# Patient Record
Sex: FEMALE | Race: WHITE | NOT HISPANIC OR LATINO | Employment: OTHER | URBAN - METROPOLITAN AREA
[De-identification: names, ages, dates, MRNs, and addresses within clinical notes are randomized per-mention and may not be internally consistent; named-entity substitution may affect disease eponyms.]

---

## 2017-01-31 DIAGNOSIS — B37.2 CANDIDIASIS OF SKIN: Primary | ICD-10-CM

## 2017-01-31 RX ORDER — CICLOPIROX OLAMINE 7.7 MG/G
CREAM TOPICAL 2 TIMES DAILY
Qty: 30 G | Refills: 2 | Status: SHIPPED | OUTPATIENT
Start: 2017-01-31 | End: 2017-08-11

## 2017-04-03 DIAGNOSIS — Z76.0 MEDICATION REFILL: ICD-10-CM

## 2017-04-03 DIAGNOSIS — K30 ACID INDIGESTION: Primary | ICD-10-CM

## 2017-04-03 RX ORDER — CIMETIDINE 400 MG
TABLET ORAL
Qty: 90 TABLET | Refills: 0 | Status: SHIPPED | OUTPATIENT
Start: 2017-04-03 | End: 2017-07-12

## 2017-05-19 ENCOUNTER — TRANSFERRED RECORDS (OUTPATIENT)
Dept: FAMILY MEDICINE | Facility: CLINIC | Age: 82
End: 2017-05-19

## 2017-06-08 DIAGNOSIS — Z76.0 ENCOUNTER FOR MEDICATION REFILL: ICD-10-CM

## 2017-06-08 RX ORDER — LISINOPRIL 2.5 MG/1
TABLET ORAL
Qty: 90 TABLET | Refills: 0 | Status: SHIPPED | OUTPATIENT
Start: 2017-06-08 | End: 2017-09-13

## 2017-06-27 ENCOUNTER — MEDICAL CORRESPONDENCE (OUTPATIENT)
Dept: FAMILY MEDICINE | Facility: CLINIC | Age: 82
End: 2017-06-27

## 2017-07-12 DIAGNOSIS — K30 ACID INDIGESTION: ICD-10-CM

## 2017-07-12 DIAGNOSIS — Z76.0 MEDICATION REFILL: ICD-10-CM

## 2017-07-13 RX ORDER — CIMETIDINE 400 MG
TABLET ORAL
Qty: 90 TABLET | Refills: 0 | Status: SHIPPED | OUTPATIENT
Start: 2017-07-13 | End: 2017-10-11

## 2017-07-28 DIAGNOSIS — B37.2 YEAST DERMATITIS: ICD-10-CM

## 2017-07-28 RX ORDER — NYSTATIN 100000 [USP'U]/G
POWDER TOPICAL
Qty: 60 G | Refills: 0 | Status: SHIPPED | OUTPATIENT
Start: 2017-07-28 | End: 2017-10-04

## 2017-08-11 DIAGNOSIS — B37.2 CANDIDIASIS OF SKIN: ICD-10-CM

## 2017-08-14 RX ORDER — CICLOPIROX OLAMINE 7.7 MG/G
CREAM TOPICAL
Qty: 30 G | Refills: 0 | Status: SHIPPED | OUTPATIENT
Start: 2017-08-14 | End: 2017-09-15

## 2017-08-18 ENCOUNTER — OFFICE VISIT (OUTPATIENT)
Dept: FAMILY MEDICINE | Facility: CLINIC | Age: 82
End: 2017-08-18

## 2017-08-18 VITALS
RESPIRATION RATE: 20 BRPM | SYSTOLIC BLOOD PRESSURE: 116 MMHG | HEART RATE: 96 BPM | DIASTOLIC BLOOD PRESSURE: 58 MMHG | TEMPERATURE: 99 F | OXYGEN SATURATION: 91 %

## 2017-08-18 DIAGNOSIS — J20.9 ACUTE BRONCHITIS, UNSPECIFIED ORGANISM: Primary | ICD-10-CM

## 2017-08-18 DIAGNOSIS — R53.81 PHYSICAL DECONDITIONING: ICD-10-CM

## 2017-08-18 DIAGNOSIS — G72.41 INCLUSION BODY MYOSITIS (H): ICD-10-CM

## 2017-08-18 PROCEDURE — 99214 OFFICE O/P EST MOD 30 MIN: CPT | Performed by: FAMILY MEDICINE

## 2017-08-18 RX ORDER — AZITHROMYCIN 250 MG/1
TABLET, FILM COATED ORAL
Qty: 6 TABLET | Refills: 0 | Status: SHIPPED | OUTPATIENT
Start: 2017-08-18 | End: 2018-07-18

## 2017-08-18 NOTE — PROGRESS NOTES
SUBJECTIVE:   Nelia Solano  is a 89 year old  year old female presenting with a complaint of  Cough.   The  cough productive of green/yellow sputum, with shortness of breath, waxing and waning over time for 3 days.   Quality: copious  Severity: moderate  Associated symptoms: myalgias and malaise.  Current Outpatient Prescriptions   Medication Sig Dispense Refill     ciclopirox (LOPROX) 0.77 % cream APPLY EXTERNALLY TO THE AFFECTED AREA TWICE DAILY 30 g 0     NYSTOP 536907 UNIT/GM POWD powder APPLY TOPICALLY TWICE DAILY AS NEEDED. 60 g 0     cimetidine (TAGAMET) 400 MG tablet TAKE 1 TABLET BY MOUTH AT BEDTIME 90 tablet 0     lisinopril (PRINIVIL/ZESTRIL) 2.5 MG tablet TAKE 1 TABLET BY MOUTH DAILY 90 tablet 0     PRIMIDONE PO Take 50 mg by mouth See Admin Instructions 50mg in am, 50 mg  at noon , 25 mg dinner time       Methotrexate Sodium (METHOTREXATE PO) Take 2.5 mg by mouth once a week 5 tabs = 12 mg wed or thur       VITAMIN D, CHOLECALCIFEROL, PO Take 2,000 Units by mouth daily       calcium carb 1250 mg, 500 mg [place],/vitamin D 200 units (OSCAL WITH D) 500-200 MG-UNIT per tablet Take 1 tablet by mouth 2 times daily (with meals)       ALPRAZolam (XANAX) 0.25 MG tablet Take 1 tablet (0.25 mg) by mouth 3 times daily 30 tablet 0     folic acid (FOLVITE) 1 MG tablet Take 1 mg by mouth 3 times daily.       Social History   Substance Use Topics     Smoking status: Never Smoker     Smokeless tobacco: Never Used     Alcohol use 3.0 oz/week     6 drink(s) per week       ROS:  CONSTITUTIONAL:no fever, no chills or sweats, no excessive fatigue, no significant change in weight  ENT: no ear pain, no nose or sinus problems, no mouth or throat problems, no dysphagia, no hoarseness  ROS otherwise negative    OBJECTIVE  :/58  Pulse 96  Temp 99  F (37.2  C)  Resp 20  SpO2 91%   APPEARANCE: = mild distress, fatigued and in a wheelchair  Conj/Eyelids = Nrl  PERRLA/Irises = Nrl  Ears/Nose = Nrl  Ear canals and TM's =  Nrl  Lips/Teeth/Gums = Nrl  Oropharynx = Nrl  Neck = Nrl  Resp effort = Nrl  Breath Sounds =  bilateral rhonchi  Recent/Remote Memory = Nrl  NEURO: mentation intact, speech normal, tremor resting and weakness of legs and arms  Mood/Affect = Nrl    Assessment/Plan:  Nelia was seen today for cough and fever.    Diagnoses and all orders for this visit:    Acute bronchitis, unspecified organism  -     azithromycin (ZITHROMAX) 250 MG tablet; Two tablets first day, then one tablet daily for four days.    Physical deconditioning    Inclusion body myositis    >25 min spent with patient, greater than 50% spent on discussion/education/planning, etc. About The primary encounter diagnosis was Acute bronchitis, unspecified organism. Diagnoses of Physical deconditioning and Inclusion body myositis were also pertinent to this visit.  Long discussion about living situation. In the end they feel that they are ok, they basically live on one floor    Pavan Kern MD  [facility]  [phone]

## 2017-08-18 NOTE — MR AVS SNAPSHOT
"              After Visit Summary   2017    Nelia Solano    MRN: [id number]           Patient Information     Date Of Birth          1928        Visit Information        Provider Department      2017 10:45 AM Pavan Kern MD [facility]        Today's Diagnoses     Acute bronchitis, unspecified organism    -  1    Physical deconditioning        Inclusion body myositis           Follow-ups after your visit        Who to contact     If you have questions or need follow up information about today's clinic visit or your schedule please contact [facility] directly at [phone].  Normal or non-critical lab and imaging results will be communicated to you by [email or web address], letter or phone within 4 business days after the clinic has received the results. If you do not hear from us within 7 days, please contact the clinic through [email or web address] or phone. If you have a critical or abnormal lab result, we will notify you by phone as soon as possible.  Submit refill requests through [employer] or call your pharmacy and they will forward the refill request to us. Please allow 3 business days for your refill to be completed.          Additional Information About Your Visit        MyChart Information     Hy[employer]os lets you send messages to your doctor, view your test results, renew your prescriptions, schedule appointments and more. To sign up, go to [email or web address] . Click on \"Log in\" on the left side of the screen, which will take you to the Welcome page. Then click on \"Sign up Now\" on the right side of the page.     You will be asked to enter the access code listed below, as well as some personal information. Please follow the directions to create your username and password.     Your access code is: [email or web address]  Expires: 2017 11:27 AM     Your access code will  in 90 days. If you need help or a new code, please call your [facility] clinic or [phone].        Care " EveryWhere ID     This is your Care EveryWhere ID. This could be used by other organizations to access your [place] medical records  [id number]        Your Vitals Were     Pulse Temperature Respirations Pulse Oximetry          96 99  F (37.2  C) 20 91%         Blood Pressure from Last 3 Encounters:   08/18/17 116/58   11/07/16 122/80   02/11/16 104/62    Weight from Last 3 Encounters:   01/26/15 62 kg (136 lb 11 oz)   12/24/13 56.7 kg (125 lb)   09/13/13 59 kg (130 lb)              Today, you had the following     No orders found for display         Today's Medication Changes          These changes are accurate as of: 8/18/17 11:27 AM.  If you have any questions, ask your nurse or doctor.               Start taking these medicines.        Dose/Directions    azithromycin 250 MG tablet   Commonly known as:  ZITHROMAX   Used for:  Acute bronchitis, unspecified organism   Started by:  Pavan Kern MD        Two tablets first day, then one tablet daily for four days.   Quantity:  6 tablet   Refills:  0            Where to get your medicines      These medications were sent to [facility] [id number] [place] [address] [address] AT [facility] [address]  [address], [facility] [address]     Phone:  [phone]     azithromycin 250 MG tablet                Primary Care Provider Office Phone # Fax #    Pavan Kern [phone] [address]  [facility] [address]        Equal Access to Services     PATRICIA CHAND AH: Hadii aad ku hadasho Soclaraali, waaxda luqadaha, qaybta kaalmada adeegyada, waxuma que butterfield. So [facility] [phone].    ATENCIÓN: Si habla español, tiene a landis disposición servicios gratuitos de asistencia lingüística. Llame al [phone].    We comply with applicable federal civil rights laws and [place] laws. We do not discriminate on the basis of race, color, national origin, age, disability sex, sexual orientation or  gender identity.            Thank you!     Thank you for choosing [facility]  for your care. Our goal is always to provide you with excellent care. Hearing back from our patients is one way we can continue to improve our services. Please take a few minutes to complete the written survey that you may receive in the mail after your visit with us. Thank you!             Your Updated Medication List - Protect others around you: Learn how to safely use, store and throw away your medicines at www.disposemymeds.org.          This list is accurate as of: 8/18/17 11:27 AM.  Always use your most recent med list.                   Brand Name Dispense Instructions for use Diagnosis    ALPRAZolam 0.25 MG tablet    XANAX    30 tablet    Take 1 tablet (0.25 mg) by mouth 3 times daily    Anxiety       azithromycin 250 MG tablet    ZITHROMAX    6 tablet    Two tablets first day, then one tablet daily for four days.    Acute bronchitis, unspecified organism       calcium carb 1250 mg (500 mg [place])/vitamin D 200 units 500-200 MG-UNIT per tablet    OSCAL with D     Take 1 tablet by mouth 2 times daily (with meals)        ciclopirox 0.77 % cream    LOPROX    30 g    APPLY EXTERNALLY TO THE AFFECTED AREA TWICE DAILY    Candidiasis of skin       cimetidine 400 MG tablet    TAGAMET    90 tablet    TAKE 1 TABLET BY MOUTH AT BEDTIME    Medication refill, Acid indigestion       folic acid 1 MG tablet    FOLVITE     Take 1 mg by mouth 3 times daily.        lisinopril 2.5 MG tablet    PRINIVIL/Zestril    90 tablet    TAKE 1 TABLET BY MOUTH DAILY    Encounter for medication refill       METHOTREXATE PO      Take 2.5 mg by mouth once a week 5 tabs = 12 mg wed or thur        NYSTOP 739471 UNIT/GM Powd   Generic drug:  nystatin     60 g    APPLY TOPICALLY TWICE DAILY AS NEEDED.    Yeast dermatitis       PRIMIDONE PO      Take 50 mg by mouth See Admin Instructions 50mg in am, 50 mg  at noon , 25 mg dinner time        VITAMIN D  (CHOLECALCIFEROL) PO      Take 2,000 Units by mouth daily

## 2017-09-13 DIAGNOSIS — Z76.0 ENCOUNTER FOR MEDICATION REFILL: ICD-10-CM

## 2017-09-13 NOTE — TELEPHONE ENCOUNTER
Pending Prescriptions:                       Disp   Refills    lisinopril (PRINIVIL/ZESTRIL) 2.5 MG table*90 tab*0        Sig: TAKE 1 TABLET BY MOUTH DAILY    Last OV 8/18/17  Patient needs CPX    Lab Results   Component Value Date    WBC 6.5 02/11/2016    WBC 10.9 01/28/2015     Lab Results   Component Value Date    RBC 4.03 02/11/2016    RBC 3.56 01/28/2015     Lab Results   Component Value Date    HGB 12.2 02/11/2016     Lab Results   Component Value Date    HCT 35.8 02/11/2016     No components found for: MCT  Lab Results   Component Value Date    MCV 88.7 02/11/2016     Lab Results   Component Value Date    MCH 30.3 02/11/2016     Lab Results   Component Value Date    MCHC 34.1 02/11/2016     Lab Results   Component Value Date    RDW 16.1 01/28/2015     Lab Results   Component Value Date     02/11/2016     TSH   Date Value Ref Range Status   10/10/2011 1.73 0.4 - 5.0 mU/L Final       Last Basic Metabolic Panel:  Lab Results   Component Value Date     02/11/2016      Lab Results   Component Value Date    POTASSIUM 4.2 02/11/2016     Lab Results   Component Value Date    CHLORIDE 99 02/11/2016     Lab Results   Component Value Date    BRANDON 9.6 02/11/2016     Lab Results   Component Value Date    CO2 27 01/27/2015     Lab Results   Component Value Date    BUN 13 02/11/2016    BUN 34 02/11/2016     Lab Results   Component Value Date    CR 0.38 02/11/2016     Lab Results   Component Value Date    GLC 79 02/11/2016

## 2017-09-15 DIAGNOSIS — B37.2 CANDIDIASIS OF SKIN: ICD-10-CM

## 2017-09-15 DIAGNOSIS — Z76.0 ENCOUNTER FOR MEDICATION REFILL: Primary | ICD-10-CM

## 2017-09-15 RX ORDER — CICLOPIROX OLAMINE 7.7 MG/G
CREAM TOPICAL
Qty: 30 G | Refills: 3 | Status: SHIPPED | OUTPATIENT
Start: 2017-09-15 | End: 2018-05-12

## 2017-09-15 RX ORDER — LISINOPRIL 2.5 MG/1
TABLET ORAL
Qty: 90 TABLET | Refills: 3 | Status: SHIPPED | OUTPATIENT
Start: 2017-09-15 | End: 2018-09-15

## 2017-09-15 NOTE — TELEPHONE ENCOUNTER
Pending Prescriptions:                       Disp   Refills    ciclopirox (LOPROX) 0.77 % cream [Pharmacy*30 g   0        Sig: APPLY EXTERNALLY TO THE AFFECTED AREA TWICE DAILY    Last OV 8/18/17    Lab Results   Component Value Date    WBC 6.5 02/11/2016    WBC 10.9 01/28/2015     Lab Results   Component Value Date    RBC 4.03 02/11/2016    RBC 3.56 01/28/2015     Lab Results   Component Value Date    HGB 12.2 02/11/2016     Lab Results   Component Value Date    HCT 35.8 02/11/2016     No components found for: MCT  Lab Results   Component Value Date    MCV 88.7 02/11/2016     Lab Results   Component Value Date    MCH 30.3 02/11/2016     Lab Results   Component Value Date    MCHC 34.1 02/11/2016     Lab Results   Component Value Date    RDW 16.1 01/28/2015     Lab Results   Component Value Date     02/11/2016     TSH   Date Value Ref Range Status   10/10/2011 1.73 0.4 - 5.0 mU/L Final     Last Basic Metabolic Panel:  Lab Results   Component Value Date     02/11/2016      Lab Results   Component Value Date    POTASSIUM 4.2 02/11/2016     Lab Results   Component Value Date    CHLORIDE 99 02/11/2016     Lab Results   Component Value Date    BRANDON 9.6 02/11/2016     Lab Results   Component Value Date    CO2 27 01/27/2015     Lab Results   Component Value Date    BUN 13 02/11/2016    BUN 34 02/11/2016     Lab Results   Component Value Date    CR 0.38 02/11/2016     Lab Results   Component Value Date    GLC 79 02/11/2016     No results found for: CHOL  No results found for: HDL  No results found for: LDL  No results found for: TRIG  No results found for: CHOLHDLRATIO

## 2017-10-04 DIAGNOSIS — B37.2 YEAST DERMATITIS: ICD-10-CM

## 2017-10-05 RX ORDER — NYSTATIN 100000 [USP'U]/G
POWDER TOPICAL
Qty: 60 G | Refills: 0 | Status: SHIPPED | OUTPATIENT
Start: 2017-10-05 | End: 2017-11-11

## 2017-10-09 DIAGNOSIS — Z23 NEED FOR PROPHYLACTIC VACCINATION AND INOCULATION AGAINST INFLUENZA: Primary | ICD-10-CM

## 2017-10-09 PROCEDURE — 90662 IIV NO PRSV INCREASED AG IM: CPT | Performed by: FAMILY MEDICINE

## 2017-10-09 PROCEDURE — G0008 ADMIN INFLUENZA VIRUS VAC: HCPCS | Performed by: FAMILY MEDICINE

## 2017-10-09 NOTE — PROGRESS NOTES
Injectable Influenza Immunization Documentation    1.  Is the person to be vaccinated sick today?   No    2. Does the person to be vaccinated have an allergy to a component   of the vaccine?   No    3. Has the person to be vaccinated ever had a serious reaction   to influenza vaccine in the past?   No    4. Has the person to be vaccinated ever had Guillain-Barré syndrome?   No    Form completed by leonardo sunshine

## 2017-10-11 DIAGNOSIS — K30 ACID INDIGESTION: ICD-10-CM

## 2017-10-11 DIAGNOSIS — Z76.0 MEDICATION REFILL: ICD-10-CM

## 2017-10-11 NOTE — TELEPHONE ENCOUNTER
tagamet last OV - 8/8/18 last labs 12/8/16 @ [facility]  Gilda Monsivais MA October 11, 2017 4:54 PM

## 2017-10-12 RX ORDER — CIMETIDINE 400 MG
TABLET ORAL
Qty: 90 TABLET | Refills: 3 | Status: SHIPPED | OUTPATIENT
Start: 2017-10-12 | End: 2022-01-01

## 2017-11-11 DIAGNOSIS — B37.2 YEAST DERMATITIS: ICD-10-CM

## 2017-11-13 RX ORDER — NYSTATIN 100000 [USP'U]/G
POWDER TOPICAL
Qty: 60 G | Refills: 0 | Status: SHIPPED | OUTPATIENT
Start: 2017-11-13 | End: 2017-12-08

## 2017-11-20 ENCOUNTER — TRANSFERRED RECORDS (OUTPATIENT)
Dept: FAMILY MEDICINE | Facility: CLINIC | Age: 82
End: 2017-11-20

## 2017-11-20 ENCOUNTER — HOSPITAL ENCOUNTER (OUTPATIENT)
Dept: LAB | Facility: CLINIC | Age: 82
Discharge: HOME | End: 2017-11-20
Attending: PSYCHIATRY & NEUROLOGY | Admitting: PSYCHIATRY & NEUROLOGY
Payer: MEDICARE

## 2017-11-20 ENCOUNTER — HOSPITAL ENCOUNTER (OUTPATIENT)
Dept: GENERAL RADIOLOGY | Facility: CLINIC | Age: 82
End: 2017-11-20
Attending: PSYCHIATRY & NEUROLOGY
Payer: MEDICARE

## 2017-11-20 DIAGNOSIS — M62.81 GENERALIZED MUSCLE WEAKNESS: ICD-10-CM

## 2017-11-20 DIAGNOSIS — G72.41 IBM (INCLUSION BODY MYOSITIS) (H): Primary | ICD-10-CM

## 2017-11-20 DIAGNOSIS — G72.41 INCLUSION BODY MYOSITIS (H): ICD-10-CM

## 2017-11-20 DIAGNOSIS — M60.9 MYOSITIS: ICD-10-CM

## 2017-11-20 LAB
ALT SERPL W P-5'-P-CCNC: 23 U/L (ref 0–50)
AST SERPL W P-5'-P-CCNC: 25 U/L (ref 0–45)
BILIRUB DIRECT SERPL-MCNC: <0.1 MG/DL (ref 0–0.2)
BILIRUB SERPL-MCNC: 0.2 MG/DL (ref 0.2–1.3)

## 2017-11-20 PROCEDURE — 84450 TRANSFERASE (AST) (SGOT): CPT | Performed by: PSYCHIATRY & NEUROLOGY

## 2017-11-20 PROCEDURE — 71020 XR CHEST 2 VW: CPT

## 2017-11-20 PROCEDURE — 82247 BILIRUBIN TOTAL: CPT | Performed by: PSYCHIATRY & NEUROLOGY

## 2017-11-20 PROCEDURE — 36415 COLL VENOUS BLD VENIPUNCTURE: CPT | Performed by: PSYCHIATRY & NEUROLOGY

## 2017-11-20 PROCEDURE — 82248 BILIRUBIN DIRECT: CPT | Performed by: PSYCHIATRY & NEUROLOGY

## 2017-11-20 PROCEDURE — 84460 ALANINE AMINO (ALT) (SGPT): CPT | Performed by: PSYCHIATRY & NEUROLOGY

## 2017-12-08 DIAGNOSIS — B37.2 YEAST DERMATITIS: ICD-10-CM

## 2017-12-08 NOTE — TELEPHONE ENCOUNTER
nystatin (MYCOSTATIN) 059188 UNIT/GM POWD   LAST  Med check 8/18/17    last labs(for RX) 11/20/17 @ hospital  Next  appt scheduled =  none  Gilda Monsivais MA

## 2017-12-10 RX ORDER — NYSTATIN 100000 [USP'U]/G
POWDER TOPICAL
Qty: 60 G | Refills: 3 | Status: SHIPPED | OUTPATIENT
Start: 2017-12-10 | End: 2018-09-08

## 2018-05-12 DIAGNOSIS — Z76.0 ENCOUNTER FOR MEDICATION REFILL: ICD-10-CM

## 2018-05-13 RX ORDER — CICLOPIROX OLAMINE 7.7 MG/G
CREAM TOPICAL
Qty: 30 G | Refills: 0 | Status: SHIPPED | OUTPATIENT
Start: 2018-05-13 | End: 2018-09-16

## 2018-05-21 ENCOUNTER — HOSPITAL ENCOUNTER (OUTPATIENT)
Dept: ULTRASOUND IMAGING | Facility: CLINIC | Age: 83
Discharge: HOME | End: 2018-05-21
Attending: PSYCHIATRY & NEUROLOGY | Admitting: PSYCHIATRY & NEUROLOGY
Payer: MEDICARE

## 2018-05-21 DIAGNOSIS — I82.4Y2 DEEP VEIN THROMBOSIS (DVT) OF PROXIMAL VEIN OF LEFT LOWER EXTREMITY, UNSPECIFIED CHRONICITY (H): ICD-10-CM

## 2018-05-21 PROCEDURE — 93971 EXTREMITY STUDY: CPT | Mod: LT

## 2018-07-18 ENCOUNTER — OFFICE VISIT (OUTPATIENT)
Dept: FAMILY MEDICINE | Facility: CLINIC | Age: 83
End: 2018-07-18

## 2018-07-18 VITALS
RESPIRATION RATE: 16 BRPM | SYSTOLIC BLOOD PRESSURE: 114 MMHG | HEART RATE: 100 BPM | DIASTOLIC BLOOD PRESSURE: 72 MMHG | OXYGEN SATURATION: 94 %

## 2018-07-18 DIAGNOSIS — R60.0 LOWER LEG EDEMA: Primary | ICD-10-CM

## 2018-07-18 PROCEDURE — 99213 OFFICE O/P EST LOW 20 MIN: CPT | Performed by: NURSE PRACTITIONER

## 2018-07-18 NOTE — PROGRESS NOTES
Problem(s) Oriented visit        SUBJECTIVE:                                                    Nelia Solano is a 89 year old female who presents to clinic today for the following health issues : left leg swelling by ankle, some days worse than others. Has been going on for months. US negative for DVT in May. No pain in calf or behind knee, no sob or chest pain. Elevates leg sometimes.     Problem list, Medication list, Allergies, and Medical/Social/Surgical histories reviewed in [facility] and updated as appropriate.   Additional history: as documented    ROS:  5 point ROS completed and negative except noted above, including Gen, CV, Resp, GI, MS    Histories:   Patient Active Problem List   Diagnosis     Inclusion body myositis     Acid indigestion     Impaired glucose tolerance     Spinal stenosis, lumbar     Tremor     Vertigo     Hypertension     Health Care Home     Tibia/fibula fracture     Essential tremor     Physical deconditioning     Anemia, unspecified anemia type     Past Surgical History:   Procedure Laterality Date     HYSTERECTOMY       HYSTERECTOMY SUPRACERVICAL, BILATERAL SALPINGO-OOPHORECTOMY, COMBINED      cystadenomaadenoma     JOINT REPLACEMENT       OPEN REDUCTION INTERNAL FIXATION TIBIA Right 1/27/2015    Procedure: OPEN REDUCTION INTERNAL FIXATION TIBIA;  Surgeon: Rocco Campbell MD;  Location:  OR     ORTHOPEDIC SURGERY  hip       Social History   Substance Use Topics     Smoking status: Never Smoker     Smokeless tobacco: Never Used     Alcohol use 3.0 oz/week     6 drink(s) per week     No family history on file.        OBJECTIVE:                                                    /72  Pulse 100  Resp 16  SpO2 94%  There is no height or weight on file to calculate BMI.   APPEARANCE: = Relaxed and in no distress  Resp effort = Calm regular breathing  Breath Sounds = Good air movement with no rales or rhonchi in any lung fields  Heart Rate, Rhythm, & sounds (no Murm)  =  Regular rate and rhythm with no S3, S4, or murmur appreciated.  Ext (edema) =  1+ and ankles only left ankle  Musculsktl: extremities normal- no gross deformities noted  SKIN = absent significant rashes or lesions, skin warm, temp equal bilaterally, color normal   Recent/Remote Memory = Alert and Oriented x 3  Mood/Affect =  mentation appears normal and worried     ASSESSMENT/PLAN:                                                    1. Lower leg edema  Elevate leg when swollen, wear stocking to knee  F/u if pain benind calf or knee, leg hot and tight, sob, chest pain    FUTURE APPOINTMENTS:       - Follow-up for annual visit or as needed  Regular exercise    The following health maintenance items are reviewed in Epic and correct as of today:  Health Maintenance   Topic Date Due     FOOT EXAM Q1 YEAR  04/27/1929     EYE EXAM Q1 YEAR  04/27/1929     LIPID MONITORING Q1 YEAR  04/27/1929     MICROALBUMIN Q1 YEAR  04/27/1929     PHQ-2 Q1 YR  04/27/1940     MEDICARE ANNUAL WELLNESS VISIT  04/27/1946     TETANUS IMMUNIZATION (SYSTEM ASSIGNED)  04/27/1946     ADVANCE DIRECTIVE PLANNING Q5 YRS  04/27/1983     TSH W/ FREE T4 REFLEX Q2 YEAR  10/10/2013     A1C Q6 MO  04/27/2015     CREATININE Q1 YEAR  02/11/2017     FALL RISK ASSESSMENT  11/07/2017     INFLUENZA VACCINE (1) 09/01/2018     PNEUMOCOCCAL  Completed       CARA Estrella CNP  [facility]  Family Practice  [facility]  [phone]    For any issues my office # is [phone]

## 2018-07-18 NOTE — MR AVS SNAPSHOT
After Visit Summary   7/18/2018    Nelia Solano    MRN: [id number]           Patient Information     Date Of Birth          4/27/1928        Visit Information        Provider Department      7/18/2018 12:30 PM Kay Olivera APRN CNP [facility]        Today's Diagnoses     Lower leg edema    -  1       Follow-ups after your visit        Follow-up notes from your care team     Return if symptoms worsen or fail to improve.      Who to contact     If you have questions or need follow up information about today's clinic visit or your schedule please contact [facility] directly at [phone].  Normal or non-critical lab and imaging results will be communicated to you by MyChart, letter or phone within 4 business days after the clinic has received the results. If you do not hear from us within 7 days, please contact the clinic through MyChart or phone. If you have a critical or abnormal lab result, we will notify you by phone as soon as possible.  Submit refill requests through [employer] or call your pharmacy and they will forward the refill request to us. Please allow 3 business days for your refill to be completed.          Additional Information About Your Visit        Care EveryWhere ID     This is your Care EveryWhere ID. This could be used by other organizations to access your [place] medical records  [id number]        Your Vitals Were     Pulse Respirations Pulse Oximetry             100 16 94%          Blood Pressure from Last 3 Encounters:   07/18/18 114/72   08/18/17 116/58   11/07/16 122/80    Weight from Last 3 Encounters:   01/26/15 62 kg (136 lb 11 oz)   12/24/13 56.7 kg (125 lb)   09/13/13 59 kg (130 lb)              Today, you had the following     No orders found for display       Primary Care Provider Office Phone # Fax #    Pavan Kern [phone] [address] [facility] [address]        Equal Access to Services      JESI CHAND : Hadii aad ku mellissa Donovan, waaxda luqadaha, qaybta kaalmada adeasad, sánchez pinain hayaalynnette pascual tammi erica . So [facility] [phone].    ATENCIÓN: Si habla español, tiene a landis disposición servicios gratuitos de asistencia lingüística. Shaun al [phone].    We comply with applicable federal civil rights laws and [place] laws. We do not discriminate on the basis of race, color, national origin, age, disability, sex, sexual orientation, or gender identity.            Thank you!     Thank you for choosing [facility]  for your care. Our goal is always to provide you with excellent care. Hearing back from our patients is one way we can continue to improve our services. Please take a few minutes to complete the written survey that you may receive in the mail after your visit with us. Thank you!             Your Updated Medication List - Protect others around you: Learn how to safely use, store and throw away your medicines at www.disposemymeds.org.          This list is accurate as of 7/18/18 12:52 PM.  Always use your most recent med list.                   Brand Name Dispense Instructions for use Diagnosis    ALPRAZolam 0.25 MG tablet    XANAX    30 tablet    Take 1 tablet (0.25 mg) by mouth 3 times daily    Anxiety       Calcium carb-Vitamin D 500 mg [place]-200 units 500-200 MG-UNIT per tablet    OSCAL with D;[place] Calcium     Take 1 tablet by mouth 2 times daily (with meals)        ciclopirox 0.77 % cream    LOPROX    30 g    APPLY EXTERNALLY TO THE AFFECTED AREA TWICE DAILY    Encounter for medication refill       cimetidine 400 MG tablet    TAGAMET    90 tablet    TAKE 1 TABLET BY MOUTH AT BEDTIME    Medication refill, Acid indigestion       folic acid 1 MG tablet    FOLVITE     Take 1 mg by mouth 3 times daily.        lisinopril 2.5 MG tablet    PRINIVIL/Zestril    90 tablet    TAKE 1 TABLET BY MOUTH DAILY    Encounter for medication refill       METHOTREXATE PO       Take 2.5 mg by mouth once a week 5 tabs = 12 mg wed or thur        nystatin [id number] UNIT/GM Powd    MYCOSTATIN    60 g    APPLY TOPICALLY TWICE DAILY AS NEEDED    Yeast dermatitis       PRIMIDONE PO      Take 50 mg by mouth See Admin Instructions 50mg in am, 50 mg  at noon , 25 mg dinner time        VITAMIN D (CHOLECALCIFEROL) PO      Take 2,000 Units by mouth daily

## 2018-08-21 ENCOUNTER — APPOINTMENT (OUTPATIENT)
Dept: GENERAL RADIOLOGY | Facility: CLINIC | Age: 83
DRG: 179 | End: 2018-08-21
Attending: EMERGENCY MEDICINE
Payer: MEDICARE

## 2018-08-21 ENCOUNTER — HOSPITAL ENCOUNTER (INPATIENT)
Facility: CLINIC | Age: 83
LOS: 4 days | Discharge: HOME HEALTH | DRG: 179 | End: 2018-08-25
Attending: EMERGENCY MEDICINE | Admitting: INTERNAL MEDICINE
Payer: MEDICARE

## 2018-08-21 DIAGNOSIS — J18.9 PNEUMONIA OF LEFT LOWER LOBE DUE TO INFECTIOUS ORGANISM: Primary | ICD-10-CM

## 2018-08-21 DIAGNOSIS — R53.1 WEAKNESS: ICD-10-CM

## 2018-08-21 DIAGNOSIS — J69.0 ASPIRATION PNEUMONITIS (H): ICD-10-CM

## 2018-08-21 LAB
ALBUMIN SERPL-MCNC: 2.6 G/DL (ref 3.4–5)
ALBUMIN UR-MCNC: 10 MG/DL
ALP SERPL-CCNC: 148 U/L (ref 40–150)
ALT SERPL W P-5'-P-CCNC: 47 U/L (ref 0–50)
AMORPH CRY #/AREA URNS HPF: ABNORMAL /HPF
ANION GAP SERPL CALCULATED.3IONS-SCNC: 7 MMOL/L (ref 3–14)
APPEARANCE UR: ABNORMAL
AST SERPL W P-5'-P-CCNC: 41 U/L (ref 0–45)
BASOPHILS # BLD AUTO: 0.1 10E9/L (ref 0–0.2)
BASOPHILS NFR BLD AUTO: 0.7 %
BILIRUB SERPL-MCNC: 0.4 MG/DL (ref 0.2–1.3)
BILIRUB UR QL STRIP: NEGATIVE
BUN SERPL-MCNC: 17 MG/DL (ref 7–30)
CALCIUM SERPL-MCNC: 9.1 MG/DL (ref 8.5–10.1)
CHLORIDE SERPL-SCNC: 98 MMOL/L (ref 94–109)
CO2 SERPL-SCNC: 29 MMOL/L (ref 20–32)
COLOR UR AUTO: YELLOW
CREAT SERPL-MCNC: 0.46 MG/DL (ref 0.52–1.04)
DIFFERENTIAL METHOD BLD: ABNORMAL
EOSINOPHIL # BLD AUTO: 0.8 10E9/L (ref 0–0.7)
EOSINOPHIL NFR BLD AUTO: 8.8 %
ERYTHROCYTE [DISTWIDTH] IN BLOOD BY AUTOMATED COUNT: 16 % (ref 10–15)
GFR SERPL CREATININE-BSD FRML MDRD: >90 ML/MIN/1.7M2
GLUCOSE SERPL-MCNC: 119 MG/DL (ref 70–99)
GLUCOSE UR STRIP-MCNC: NEGATIVE MG/DL
HCT VFR BLD AUTO: 37.1 % (ref 35–47)
HGB BLD-MCNC: 12.3 G/DL (ref 11.7–15.7)
HGB UR QL STRIP: ABNORMAL
HYALINE CASTS #/AREA URNS LPF: 1 /LPF (ref 0–2)
IMM GRANULOCYTES # BLD: 0 10E9/L (ref 0–0.4)
IMM GRANULOCYTES NFR BLD: 0.3 %
INTERPRETATION ECG - MUSE: NORMAL
KETONES UR STRIP-MCNC: NEGATIVE MG/DL
LACTATE BLD-SCNC: 2 MMOL/L (ref 0.7–2)
LEUKOCYTE ESTERASE UR QL STRIP: NEGATIVE
LYMPHOCYTES # BLD AUTO: 2.1 10E9/L (ref 0.8–5.3)
LYMPHOCYTES NFR BLD AUTO: 23.5 %
MAGNESIUM SERPL-MCNC: 1.9 MG/DL (ref 1.6–2.3)
MCH RBC QN AUTO: 30.4 PG (ref 26.5–33)
MCHC RBC AUTO-ENTMCNC: 33.2 G/DL (ref 31.5–36.5)
MCV RBC AUTO: 92 FL (ref 78–100)
MONOCYTES # BLD AUTO: 1.7 10E9/L (ref 0–1.3)
MONOCYTES NFR BLD AUTO: 18.8 %
MUCOUS THREADS #/AREA URNS LPF: PRESENT /LPF
NEUTROPHILS # BLD AUTO: 4.3 10E9/L (ref 1.6–8.3)
NEUTROPHILS NFR BLD AUTO: 47.9 %
NITRATE UR QL: NEGATIVE
NRBC # BLD AUTO: 0 10*3/UL
NRBC BLD AUTO-RTO: 0 /100
PH UR STRIP: 6.5 PH (ref 5–7)
PLATELET # BLD AUTO: 213 10E9/L (ref 150–450)
POTASSIUM SERPL-SCNC: 4.4 MMOL/L (ref 3.4–5.3)
PROT SERPL-MCNC: 6.7 G/DL (ref 6.8–8.8)
RBC # BLD AUTO: 4.04 10E12/L (ref 3.8–5.2)
RBC #/AREA URNS AUTO: 6 /HPF (ref 0–2)
SODIUM SERPL-SCNC: 134 MMOL/L (ref 133–144)
SOURCE: ABNORMAL
SP GR UR STRIP: 1.02 (ref 1–1.03)
SQUAMOUS #/AREA URNS AUTO: 1 /HPF (ref 0–1)
TROPONIN I SERPL-MCNC: <0.015 UG/L (ref 0–0.04)
UROBILINOGEN UR STRIP-MCNC: NORMAL MG/DL (ref 0–2)
WBC # BLD AUTO: 9.1 10E9/L (ref 4–11)
WBC #/AREA URNS AUTO: 2 /HPF (ref 0–5)

## 2018-08-21 PROCEDURE — A9270 NON-COVERED ITEM OR SERVICE: HCPCS | Mod: GY | Performed by: EMERGENCY MEDICINE

## 2018-08-21 PROCEDURE — 96361 HYDRATE IV INFUSION ADD-ON: CPT

## 2018-08-21 PROCEDURE — 93005 ELECTROCARDIOGRAM TRACING: CPT

## 2018-08-21 PROCEDURE — 25000128 H RX IP 250 OP 636: Performed by: EMERGENCY MEDICINE

## 2018-08-21 PROCEDURE — 12000000 ZZH R&B MED SURG/OB

## 2018-08-21 PROCEDURE — 25000132 ZZH RX MED GY IP 250 OP 250 PS 637: Mod: GY | Performed by: EMERGENCY MEDICINE

## 2018-08-21 PROCEDURE — 71046 X-RAY EXAM CHEST 2 VIEWS: CPT

## 2018-08-21 PROCEDURE — 99285 EMERGENCY DEPT VISIT HI MDM: CPT | Mod: 25

## 2018-08-21 PROCEDURE — 96368 THER/DIAG CONCURRENT INF: CPT

## 2018-08-21 PROCEDURE — 25000132 ZZH RX MED GY IP 250 OP 250 PS 637: Mod: GY | Performed by: INTERNAL MEDICINE

## 2018-08-21 PROCEDURE — A9270 NON-COVERED ITEM OR SERVICE: HCPCS | Mod: GY | Performed by: INTERNAL MEDICINE

## 2018-08-21 PROCEDURE — 83735 ASSAY OF MAGNESIUM: CPT | Performed by: EMERGENCY MEDICINE

## 2018-08-21 PROCEDURE — 99223 1ST HOSP IP/OBS HIGH 75: CPT | Mod: AI | Performed by: INTERNAL MEDICINE

## 2018-08-21 PROCEDURE — 36415 COLL VENOUS BLD VENIPUNCTURE: CPT

## 2018-08-21 PROCEDURE — 96365 THER/PROPH/DIAG IV INF INIT: CPT

## 2018-08-21 PROCEDURE — 87040 BLOOD CULTURE FOR BACTERIA: CPT | Performed by: EMERGENCY MEDICINE

## 2018-08-21 PROCEDURE — 25000128 H RX IP 250 OP 636: Performed by: INTERNAL MEDICINE

## 2018-08-21 PROCEDURE — 81001 URINALYSIS AUTO W/SCOPE: CPT | Performed by: EMERGENCY MEDICINE

## 2018-08-21 PROCEDURE — 25000125 ZZHC RX 250: Performed by: INTERNAL MEDICINE

## 2018-08-21 PROCEDURE — 83605 ASSAY OF LACTIC ACID: CPT | Performed by: EMERGENCY MEDICINE

## 2018-08-21 PROCEDURE — 87086 URINE CULTURE/COLONY COUNT: CPT | Performed by: EMERGENCY MEDICINE

## 2018-08-21 PROCEDURE — 80053 COMPREHEN METABOLIC PANEL: CPT | Performed by: EMERGENCY MEDICINE

## 2018-08-21 PROCEDURE — 85025 COMPLETE CBC W/AUTO DIFF WBC: CPT | Performed by: EMERGENCY MEDICINE

## 2018-08-21 PROCEDURE — 84484 ASSAY OF TROPONIN QUANT: CPT | Performed by: EMERGENCY MEDICINE

## 2018-08-21 RX ORDER — FOLIC ACID 1 MG/1
1 TABLET ORAL 3 TIMES DAILY
Status: DISCONTINUED | OUTPATIENT
Start: 2018-08-21 | End: 2018-08-25 | Stop reason: HOSPADM

## 2018-08-21 RX ORDER — PRIMIDONE 50 MG/1
25 TABLET ORAL
Status: DISCONTINUED | OUTPATIENT
Start: 2018-08-21 | End: 2018-08-25 | Stop reason: HOSPADM

## 2018-08-21 RX ORDER — AMOXICILLIN 250 MG
1 CAPSULE ORAL 2 TIMES DAILY
Status: DISCONTINUED | OUTPATIENT
Start: 2018-08-21 | End: 2018-08-25 | Stop reason: HOSPADM

## 2018-08-21 RX ORDER — LIDOCAINE 40 MG/G
CREAM TOPICAL
Status: DISCONTINUED | OUTPATIENT
Start: 2018-08-21 | End: 2018-08-25 | Stop reason: HOSPADM

## 2018-08-21 RX ORDER — ACETAMINOPHEN 325 MG/1
650 TABLET ORAL EVERY 4 HOURS PRN
Status: DISCONTINUED | OUTPATIENT
Start: 2018-08-21 | End: 2018-08-24 | Stop reason: CLARIF

## 2018-08-21 RX ORDER — LISINOPRIL 2.5 MG/1
2.5 TABLET ORAL DAILY
Status: DISCONTINUED | OUTPATIENT
Start: 2018-08-22 | End: 2018-08-25 | Stop reason: HOSPADM

## 2018-08-21 RX ORDER — MICONAZOLE NITRATE 20 MG/G
CREAM TOPICAL 2 TIMES DAILY
Status: DISCONTINUED | OUTPATIENT
Start: 2018-08-21 | End: 2018-08-25 | Stop reason: HOSPADM

## 2018-08-21 RX ORDER — PRIMIDONE 50 MG/1
50 TABLET ORAL
COMMUNITY
End: 2019-11-01

## 2018-08-21 RX ORDER — BISACODYL 10 MG
10 SUPPOSITORY, RECTAL RECTAL DAILY PRN
Status: DISCONTINUED | OUTPATIENT
Start: 2018-08-21 | End: 2018-08-25 | Stop reason: HOSPADM

## 2018-08-21 RX ORDER — MAGNESIUM SULFATE HEPTAHYDRATE 40 MG/ML
2 INJECTION, SOLUTION INTRAVENOUS DAILY PRN
Status: DISCONTINUED | OUTPATIENT
Start: 2018-08-21 | End: 2018-08-25 | Stop reason: HOSPADM

## 2018-08-21 RX ORDER — AMPICILLIN AND SULBACTAM 2; 1 G/1; G/1
3 INJECTION, POWDER, FOR SOLUTION INTRAMUSCULAR; INTRAVENOUS EVERY 6 HOURS
Status: DISPENSED | OUTPATIENT
Start: 2018-08-21 | End: 2018-08-25

## 2018-08-21 RX ORDER — PRIMIDONE 50 MG/1
25 TABLET ORAL
COMMUNITY

## 2018-08-21 RX ORDER — PRIMIDONE 50 MG/1
50 TABLET ORAL EVERY MORNING
Status: DISCONTINUED | OUTPATIENT
Start: 2018-08-22 | End: 2018-08-25 | Stop reason: HOSPADM

## 2018-08-21 RX ORDER — PRIMIDONE 50 MG/1
50 TABLET ORAL
Status: DISCONTINUED | OUTPATIENT
Start: 2018-08-22 | End: 2018-08-25 | Stop reason: HOSPADM

## 2018-08-21 RX ORDER — ONDANSETRON 4 MG/1
4 TABLET, ORALLY DISINTEGRATING ORAL EVERY 6 HOURS PRN
Status: DISCONTINUED | OUTPATIENT
Start: 2018-08-21 | End: 2018-08-25 | Stop reason: HOSPADM

## 2018-08-21 RX ORDER — POTASSIUM CHLORIDE 1500 MG/1
20-40 TABLET, EXTENDED RELEASE ORAL
Status: DISCONTINUED | OUTPATIENT
Start: 2018-08-21 | End: 2018-08-25 | Stop reason: HOSPADM

## 2018-08-21 RX ORDER — ONDANSETRON 2 MG/ML
4 INJECTION INTRAMUSCULAR; INTRAVENOUS EVERY 6 HOURS PRN
Status: DISCONTINUED | OUTPATIENT
Start: 2018-08-21 | End: 2018-08-25 | Stop reason: HOSPADM

## 2018-08-21 RX ORDER — ACETAMINOPHEN 325 MG/1
650 TABLET ORAL ONCE
Status: COMPLETED | OUTPATIENT
Start: 2018-08-21 | End: 2018-08-21

## 2018-08-21 RX ORDER — OXYCODONE HYDROCHLORIDE 5 MG/1
5 TABLET ORAL EVERY 4 HOURS PRN
Status: DISCONTINUED | OUTPATIENT
Start: 2018-08-21 | End: 2018-08-25 | Stop reason: HOSPADM

## 2018-08-21 RX ORDER — MAGNESIUM SULFATE HEPTAHYDRATE 40 MG/ML
4 INJECTION, SOLUTION INTRAVENOUS EVERY 4 HOURS PRN
Status: DISCONTINUED | OUTPATIENT
Start: 2018-08-21 | End: 2018-08-25 | Stop reason: HOSPADM

## 2018-08-21 RX ORDER — POTASSIUM CHLORIDE 7.45 MG/ML
10 INJECTION INTRAVENOUS
Status: DISCONTINUED | OUTPATIENT
Start: 2018-08-21 | End: 2018-08-25 | Stop reason: HOSPADM

## 2018-08-21 RX ORDER — IPRATROPIUM BROMIDE AND ALBUTEROL SULFATE 2.5; .5 MG/3ML; MG/3ML
3 SOLUTION RESPIRATORY (INHALATION) 4 TIMES DAILY
Status: COMPLETED | OUTPATIENT
Start: 2018-08-21 | End: 2018-08-23

## 2018-08-21 RX ORDER — PRIMIDONE 50 MG/1
50 TABLET ORAL EVERY MORNING
COMMUNITY

## 2018-08-21 RX ORDER — AMOXICILLIN 250 MG
2 CAPSULE ORAL 2 TIMES DAILY
Status: DISCONTINUED | OUTPATIENT
Start: 2018-08-21 | End: 2018-08-25 | Stop reason: HOSPADM

## 2018-08-21 RX ORDER — ALBUTEROL SULFATE 0.83 MG/ML
3 SOLUTION RESPIRATORY (INHALATION)
Status: DISCONTINUED | OUTPATIENT
Start: 2018-08-21 | End: 2018-08-25 | Stop reason: HOSPADM

## 2018-08-21 RX ORDER — ALPRAZOLAM 0.25 MG
0.25 TABLET ORAL 3 TIMES DAILY
Status: DISCONTINUED | OUTPATIENT
Start: 2018-08-21 | End: 2018-08-24

## 2018-08-21 RX ORDER — POLYETHYLENE GLYCOL 3350 17 G/17G
17 POWDER, FOR SOLUTION ORAL DAILY PRN
Status: DISCONTINUED | OUTPATIENT
Start: 2018-08-21 | End: 2018-08-25 | Stop reason: HOSPADM

## 2018-08-21 RX ORDER — POTASSIUM CHLORIDE 29.8 MG/ML
20 INJECTION INTRAVENOUS
Status: DISCONTINUED | OUTPATIENT
Start: 2018-08-21 | End: 2018-08-25 | Stop reason: HOSPADM

## 2018-08-21 RX ORDER — SODIUM CHLORIDE 9 MG/ML
INJECTION, SOLUTION INTRAVENOUS CONTINUOUS
Status: ACTIVE | OUTPATIENT
Start: 2018-08-21 | End: 2018-08-22

## 2018-08-21 RX ORDER — CEFTRIAXONE 2 G/1
2 INJECTION, POWDER, FOR SOLUTION INTRAMUSCULAR; INTRAVENOUS ONCE
Status: COMPLETED | OUTPATIENT
Start: 2018-08-21 | End: 2018-08-21

## 2018-08-21 RX ORDER — POTASSIUM CL/LIDO/0.9 % NACL 10MEQ/0.1L
10 INTRAVENOUS SOLUTION, PIGGYBACK (ML) INTRAVENOUS
Status: DISCONTINUED | OUTPATIENT
Start: 2018-08-21 | End: 2018-08-25 | Stop reason: HOSPADM

## 2018-08-21 RX ORDER — NALOXONE HYDROCHLORIDE 0.4 MG/ML
.1-.4 INJECTION, SOLUTION INTRAMUSCULAR; INTRAVENOUS; SUBCUTANEOUS
Status: DISCONTINUED | OUTPATIENT
Start: 2018-08-21 | End: 2018-08-25 | Stop reason: HOSPADM

## 2018-08-21 RX ORDER — CICLOPIROX OLAMINE 7.7 MG/G
CREAM TOPICAL 2 TIMES DAILY
Status: DISCONTINUED | OUTPATIENT
Start: 2018-08-21 | End: 2018-08-21 | Stop reason: CLARIF

## 2018-08-21 RX ORDER — POTASSIUM CHLORIDE 1.5 G/1.58G
20-40 POWDER, FOR SOLUTION ORAL
Status: DISCONTINUED | OUTPATIENT
Start: 2018-08-21 | End: 2018-08-25 | Stop reason: HOSPADM

## 2018-08-21 RX ADMIN — CEFTRIAXONE SODIUM 2 G: 2 INJECTION, POWDER, FOR SOLUTION INTRAMUSCULAR; INTRAVENOUS at 16:40

## 2018-08-21 RX ADMIN — IPRATROPIUM BROMIDE AND ALBUTEROL SULFATE 3 ML: .5; 3 SOLUTION RESPIRATORY (INHALATION) at 19:41

## 2018-08-21 RX ADMIN — MICONAZOLE NITRATE: 20 CREAM TOPICAL at 20:27

## 2018-08-21 RX ADMIN — Medication 25 MG: at 20:27

## 2018-08-21 RX ADMIN — AMPICILLIN SODIUM AND SULBACTAM SODIUM 3 G: 2; 1 INJECTION, POWDER, FOR SOLUTION INTRAMUSCULAR; INTRAVENOUS at 19:13

## 2018-08-21 RX ADMIN — AZITHROMYCIN MONOHYDRATE 500 MG: 500 INJECTION, POWDER, LYOPHILIZED, FOR SOLUTION INTRAVENOUS at 17:12

## 2018-08-21 RX ADMIN — ALPRAZOLAM 0.25 MG: 0.25 TABLET ORAL at 20:27

## 2018-08-21 RX ADMIN — CIMETIDINE 400 MG: 200 TABLET, FILM COATED ORAL at 20:27

## 2018-08-21 RX ADMIN — SODIUM CHLORIDE 1000 ML: 9 INJECTION, SOLUTION INTRAVENOUS at 15:39

## 2018-08-21 RX ADMIN — SODIUM CHLORIDE: 9 INJECTION, SOLUTION INTRAVENOUS at 19:12

## 2018-08-21 RX ADMIN — ACETAMINOPHEN 650 MG: 325 TABLET, FILM COATED ORAL at 15:43

## 2018-08-21 ASSESSMENT — ENCOUNTER SYMPTOMS
ABDOMINAL PAIN: 0
COUGH: 1
FEVER: 1
FATIGUE: 1
TROUBLE SWALLOWING: 1
SHORTNESS OF BREATH: 0
BACK PAIN: 0
DIFFICULTY URINATING: 1
SORE THROAT: 0

## 2018-08-21 ASSESSMENT — ACTIVITIES OF DAILY LIVING (ADL): ADLS_ACUITY_SCORE: 21

## 2018-08-21 NOTE — IP AVS SNAPSHOT
MRN:[id number]                      After Visit Summary   8/21/2018    Nelia Solano    MRN: [id number]           Thank you!     Thank you for choosing [facility] for your care. Our goal is always to provide you with excellent care. Hearing back from our patients is one way we can continue to improve our services. Please take a few minutes to complete the written survey that you may receive in the mail after you visit with us. Thank you!        Patient Information     Date Of Birth          4/27/1928        Designated Caregiver       Most Recent Value    Caregiver    Will someone help with your care after discharge? yes    Name of designated caregiver Ace Link    Phone number of caregiver [phone]    Caregiver address [address], [place] [address]      About your hospital stay     You were admitted on:  August 21, 2018 [facility]    You were discharged on:  August 25, 2018        Reason for your hospital stay       Aspiration pneumonia with dysphagia (difficulty swallowing)                  Who to Call     For medical emergencies, please call 911.  For non-urgent questions about your medical care, please call your primary care provider or clinic, [phone]          Attending Provider     Provider Specialty    Jose Higuera MD Emergency Medicine    Nilam Alfaro MD Internal Medicine       Primary Care Provider Office Phone # Fax #    Pavan Kern [phone]      After Care Instructions     Activity       Your activity upon discharge: activity as tolerated            Diet       Follow this diet upon discharge:  Full liquid diet, NO solids.  (There is no safe diet with your swallow difficulties, but full liquids is safer than solids in your situation)                  Follow-up Appointments     Follow-up and recommended labs and tests        Patient/her  to contact their PCP office (  "Pavan Kern) in the next week.  Continue home care.                  Additional Services     Home Care SLP Referral for Hospital Discharge       SLP to eval and treat    Your provider has ordered home care - speech therapy. If you have not been contacted within 2 days of your discharge please call the department phone number listed on the top of this document.            Home care nursing referral       RN skilled nursing visit. RN to assess vital signs and weight, respiratory and cardiac status, hydration, nutrition and bowel status and home safety.  RN to teach medication management.  RN to provide lab draws.    Your provider has ordered home care nursing services. If you have not been contacted within 2 days of your discharge please call the inpatient department phone number at [phone] .                  Further instructions from your care team       A referral has been made to [facility]. A representative will call to schedule a visit.  [phone]      Pending Results     Date and Time Order Name Status Description    8/21/2018 1636 Blood culture Preliminary     8/21/2018 1636 Blood culture Preliminary             Statement of Approval     Ordered          08/25/18 1149  I have reviewed and agree with all the recommendations and orders detailed in this document.  EFFECTIVE NOW     Approved and electronically signed by:  Stephon Mathias DO             Admission Information     Date & Time Provider Department Dept. Phone    8/21/2018 Nilam Alfaro MD [place] [facility] [address] [facility] [phone]      Your Vitals Were     Blood Pressure Pulse Temperature Respirations Height Weight    132/75 (BP Location: Left arm) 82 98.4  F (36.9  C) (Oral) 19 1.575 m (5' 2\") 66.3 kg (146 lb 2.6 oz)    Pulse Oximetry BMI (Body Mass Index)                91% 26.73 kg/m2          Care EveryWhere ID     This is your Care EveryWhere ID. This could be used by other organizations to access your [facility] " medical records  [id number]        Equal Access to Services     JESI CHAND : Hadii aad ku hadmerlinlolly Donovan, warainerda gail, qakateta brittneykyleighelisa chaudhari, sánchez calderanoahjose cruz butterfield. So [facility] [phone].    ATENCIÓN: Si habla español, tiene a landis disposición servicios gratuitos de asistencia lingüística. Shaun al [phone].    We comply with applicable federal civil rights laws and [place] laws. We do not discriminate on the basis of race, color, national origin, age, disability, sex, sexual orientation, or gender identity.               Review of your medicines      START taking        Dose / Directions    acetaminophen 325 MG tablet   Commonly known as:  TYLENOL        Dose:  325-650 mg   Take 1-2 tablets (325-650 mg) by mouth every 6 hours as needed for mild pain or fever   Quantity:  100 tablet   Refills:  0       amoxicillin-clavulanate 875-125 MG per tablet   Commonly known as:  AUGMENTIN   Indication:  Pneumonia caused by Inhaling a Substance Into the Lungs        Dose:  1 tablet   Take 1 tablet by mouth every 12 hours for 6 days   Quantity:  12 tablet   Refills:  0         CONTINUE these medicines which have NOT CHANGED        Dose / Directions    ALPRAZolam 0.25 MG tablet   Commonly known as:  XANAX   Used for:  Anxiety        Dose:  0.25 mg   Take 1 tablet (0.25 mg) by mouth 3 times daily   Quantity:  30 tablet   Refills:  0       calcium carbonate 500 mg-vitamin D 200 units 500-200 MG-UNIT per tablet   Commonly known as:  OSCAL with D;OYSTER SHELL CALCIUM        Dose:  1 tablet   Take 1 tablet by mouth 2 times daily (with meals)   Refills:  0       ciclopirox 0.77 % cream   Commonly known as:  LOPROX   Used for:  Encounter for medication refill        APPLY EXTERNALLY TO THE AFFECTED AREA TWICE DAILY   Quantity:  30 g   Refills:  0       cimetidine 400 MG tablet   Commonly known as:  TAGAMET   Used for:  Medication refill, Acid indigestion        TAKE 1 TABLET BY MOUTH AT BEDTIME    Quantity:  90 tablet   Refills:  3       folic acid 1 MG tablet   Commonly known as:  FOLVITE        Dose:  1 mg   Take 1 mg by mouth 3 times daily.   Refills:  0       lisinopril 2.5 MG tablet   Commonly known as:  PRINIVIL/Zestril   Used for:  Encounter for medication refill        TAKE 1 TABLET BY MOUTH DAILY   Quantity:  90 tablet   Refills:  3       METHOTREXATE PO        Dose:  2.5 mg   Take 2.5 mg by mouth once a week 5 tabs = 12.5 mg wed or thur   Refills:  0       nystatin 068424 UNIT/GM Powd   Commonly known as:  MYCOSTATIN   Used for:  Yeast dermatitis        APPLY TOPICALLY TWICE DAILY AS NEEDED   Quantity:  60 g   Refills:  3       * primidone 50 MG tablet   Commonly known as:  MYSOLINE        Dose:  50 mg   Take 50 mg by mouth every morning   Refills:  0       * primidone 50 MG tablet   Commonly known as:  MYSOLINE        Dose:  50 mg   Take 50 mg by mouth daily (with lunch)   Refills:  0       * primidone 50 MG tablet   Commonly known as:  MYSOLINE        Dose:  25 mg   Take 25 mg by mouth daily (with dinner)   Refills:  0       VITAMIN D (CHOLECALCIFEROL) PO        Dose:  2000 Units   Take 2,000 Units by mouth daily   Refills:  0       * Notice:  This list has 3 medication(s) that are the same as other medications prescribed for you. Read the directions carefully, and ask your doctor or other care provider to review them with you.         Where to get your medicines      These medications were sent to [facility] - [address]  [address] [facility] [address], [place] [address]     Phone:  [phone]     amoxicillin-clavulanate 875-125 MG per tablet         Some of these will need a paper prescription and others can be bought over the counter. Ask your nurse if you have questions.     You don't need a prescription for these medications     acetaminophen 325 MG tablet                Protect others around you: Learn how to safely use, store and throw away your medicines at  www.disposemymeds.org.        ANTIBIOTIC INSTRUCTION     You've Been Prescribed an Antibiotic - Now What?  Your healthcare team thinks that you or your loved one might have an infection. Some infections can be treated with antibiotics, which are powerful, life-saving drugs. Like all medications, antibiotics have side effects and should only be used when necessary. There are some important things you should know about your antibiotic treatment.      Your healthcare team may run tests before you start taking an antibiotic.    Your team may take samples (e.g., from your blood, urine or other areas) to run tests to look for bacteria. These test can be important to determine if you need an antibiotic at all and, if you do, which antibiotic will work best.      Within a few days, your healthcare team might change or even stop your antibiotic.    Your team may start you on an antibiotic while they are working to find out what is making you sick.    Your team might change your antibiotic because test results show that a different antibiotic would be better to treat your infection.    In some cases, once your team has more information, they learn that you do not need an antibiotic at all. They may find out that you don't have an infection, or that the antibiotic you're taking won't work against your infection. For example, an infection caused by a virus can't be treated with antibiotics. Staying on an antibiotic when you don't need it is more likely to be harmful than helpful.      You may experience side effects from your antibiotic.    Like all medications, antibiotics have side effects. Some of these can be serious.    Let you healthcare team know if you have any known allergies when you are admitted to the hospital.    One significant side effect of nearly all antibiotics is the risk of severe and sometimes deadly diarrhea caused by Clostridium difficile (C. Difficile). This occurs when a person takes antibiotics because  some good germs are destroyed. Antibiotic use allows C. diificile to take over, putting patients at high risk for this serious infection.    As a patient or caregiver, it is important to understand your or your loved one's antibiotic treatment. It is especially important for caregivers to speak up when patients can't speak for themselves. Here are some important questions to ask your healthcare team.    What infection is this antibiotic treating and how do you know I have that infection?    What side effects might occur from this antibiotic?    How long will I need to take this antibiotic?    Is it safe to take this antibiotic with other medications or supplements (e.g., vitamins) that I am taking?     Are there any special directions I need to know about taking this antibiotic? For example, should I take it with food?    How will I be monitored to know whether my infection is responding to the antibiotic?    What tests may help to make sure the right antibiotic is prescribed for me?      Information provided by:  www.cdc.gov/getsmart  U.S. Department of Health and Human Services  Centers for disease Control and Prevention  National Center for Emerging and Zoonotic Infectious Diseases  Division of Healthcare Quality Promotion             Medication List: This is a list of all your medications and when to take them. Check marks below indicate your daily home schedule. Keep this list as a reference.      Medications           Morning Afternoon Evening Bedtime As Needed    acetaminophen 325 MG tablet   Commonly known as:  TYLENOL   Take 1-2 tablets (325-650 mg) by mouth every 6 hours as needed for mild pain or fever   Last time this was given:  650 mg on 8/21/2018  3:43 PM                                   ALPRAZolam 0.25 MG tablet   Commonly known as:  XANAX   Take 1 tablet (0.25 mg) by mouth 3 times daily   Last time this was given:  0.25 mg on 8/25/2018  9:57 AM                                          amoxicillin-clavulanate 875-125 MG per tablet   Commonly known as:  AUGMENTIN   Take 1 tablet by mouth every 12 hours for 6 days   Last time this was given:  1 tablet on 8/25/2018  9:57 AM                                      calcium carbonate 500 mg-vitamin D 200 units 500-200 MG-UNIT per tablet   Commonly known as:  OSCAL with D;OYSTER SHELL CALCIUM   Take 1 tablet by mouth 2 times daily (with meals)   Last time this was given:  1 tablet on 8/25/2018  9:58 AM                                   ciclopirox 0.77 % cream   Commonly known as:  LOPROX   APPLY EXTERNALLY TO THE AFFECTED AREA TWICE DAILY                                      cimetidine 400 MG tablet   Commonly known as:  TAGAMET   TAKE 1 TABLET BY MOUTH AT BEDTIME   Last time this was given:  400 mg on 8/24/2018 10:16 PM                                   folic acid 1 MG tablet   Commonly known as:  FOLVITE   Take 1 mg by mouth 3 times daily.   Last time this was given:  1 mg on 8/25/2018  9:58 AM                                   lisinopril 2.5 MG tablet   Commonly known as:  PRINIVIL/Zestril   TAKE 1 TABLET BY MOUTH DAILY   Last time this was given:  2.5 mg on 8/25/2018  9:58 AM                                   METHOTREXATE PO   Take 2.5 mg by mouth once a week 5 tabs = 12.5 mg wed or thur   Last time this was given:  12.5 mg on 8/24/2018  8:51 PM                                   nystatin 444153 UNIT/GM Powd   Commonly known as:  MYCOSTATIN   APPLY TOPICALLY TWICE DAILY AS NEEDED                                   * primidone 50 MG tablet   Commonly known as:  MYSOLINE   Take 50 mg by mouth every morning   Last time this was given:  50 mg on 8/25/2018  9:59 AM                                   * primidone 50 MG tablet   Commonly known as:  MYSOLINE   Take 50 mg by mouth daily (with lunch)   Last time this was given:  50 mg on 8/25/2018  9:59 AM                                   * primidone 50 MG tablet   Commonly known as:  MYSOLINE   Take 25 mg by mouth  daily (with dinner)   Last time this was given:  50 mg on 8/25/2018  9:59 AM                                   VITAMIN D (CHOLECALCIFEROL) PO   Take 2,000 Units by mouth daily   Last time this was given:  2,000 Units on 8/25/2018  9:58 AM                                   * Notice:  This list has 3 medication(s) that are the same as other medications prescribed for you. Read the directions carefully, and ask your doctor or other care provider to review them with you.

## 2018-08-21 NOTE — H&P
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 Garcia from [facility], chronic dysphasia has been seems to be giving her mechanical soft diet at home, spinal stenosis, chronic tremors and vertigo, wheelchair-bound as a baseline who was admitted from the emergency room with possible aspiration pneumonia and left lower lobe.    Assessment & Plan     Principal Problem:   Aspiration pneumonitis (H) (8/21/2018): At this time considering patient significant dysphasia and history of coughing with food intake, aspiration pneumonia cannot be completely ruled out patient was a started on ceftriaxone and azithromycin in the emergency room but at this time I did have discussion with patient and  regarding treating her as an aspiration pneumonia.  Pneumonia of left lower lobe due to infectious organism (H) (8/21/2018)  Weakness (8/21/2018)    --Admit patient to general medical floor with telemetry.  --We will order mechanical soft diet at this point, will order speech evaluation for dysphagia and proper adjustment of her diet.  --Activity as tolerated, patient has very limited mobility at home she is mostly wheelchair-bound and needs help in turning and repositioning in bed also.  --Start patient on Unasyn every 6 hours IV.  --We will order DuoNeb 4 times daily and every 2 hours as needed.  --Patient at the time of my evaluation did not have any significant bronchospasm and did not have any significant hypoxia, so will not order any steroid at this point.  --We will order gentle hydration for 1 L, patient has already received 1 L in the emergency room with a stop the fluids after that.  Fluid assessment can be reassessed if patient does  not do well with speech therapy evaluation.    Inclusion body myositis (): Patient is following closely with Dr. Garcia from [facility], [facility] and was last seen in December 2017.  Currently she is compliant with her medication and seems to be doing is stable from her myositis point of view  Tremor ()  Vertigo (10/10/2011)  Spinal stenosis, lumbar ()  Essential tremor (2/2/2015)  Physical deconditioning (2/2/2015)    --Continue patient on her home dose of Xanax 0.25 mg 3 times a day routine to help with her tremor.  --Continue patient on her home dose of Tagamet 400 mg at the bedtime.  --Continue patient on methotrexate 12.5 mg on weekly basis usually takes on Wednesday or Thursday.  --Continue patient on folic acid 1 mg p.o. 3 times a day.  --Continue patient on primidone 25 mg with supper, 50 mg every morning and 50 mg with lunch.  --Continue patient on vitamin D 2000 units daily.  --Physical and occupational therapy is ordered to assess during the hospitalization and make further recommendation if patient would benefit from getting any further care at the time of discharge.    Essential hypertension  --Start patient on lisinopril 2.5 mg p.o. daily.    # Pain Assessment:  Current Pain Score 1/29/2015   Pain score (0-10) 6   Pain location Leg   Pain descriptors -   - Nelia is experiencing pain due to chronic issues from myositis Pain management was discussed and the plan was created in a collaborative fashion.  Nelia's response to the current recommendations: engaged  - Opioid regimen: roxicodone 5 mg every 4 hours as needed for moderate to severe pain  - Response to opioid medications: Reduction of symptoms in pain  - Bowel regimen: senna  - Pharmacologic adjuvants: Acetaminophen    DVT Prophylaxis: Pneumatic Compression Devices and Anti-embolisim stockings (TEDs)  Code Status: Full Code, discussed with patient,  and son in detail     Disposition: Expected discharge in next couple of  days once making clinical improvement     Nilam Alfaro MD,FACP    Primary Care Physician   Pavan Kern    Chief Complaint   Possible urinary retention.    History is obtained from the patient    Patient Active Problem List   Diagnosis     Inclusion body myositis     Acid indigestion     Impaired glucose tolerance     Spinal stenosis, lumbar     Tremor     Vertigo     Hypertension     Health Care Home     Tibia/fibula fracture     Essential tremor     Physical deconditioning     Anemia, unspecified anemia type     Aspiration pneumonitis (H)     Pneumonia of left lower lobe due to infectious organism (H)     Weakness       History of Present Illness   Nelia Solano is a 89 year old female with past medical history significant for essential hypertension, hyperlipidemia, 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 Garcia from [facility] and has been on methotrexate weekly along with folate, primidone, Tagamet and alprazolam.    Patient currently lives at home with her , she is wheelchair-bound at baseline and does require help in changing the position in bed including at the nighttime which is currently done by her .  Patient was in her usual state of health but started to feel feverish intermittently for the past couple of days with some cough.  Of note, patient and the  told me that she has chronic issue with dysphagia and she coughs all the time with eating the  cuts her food into very small pieces and patient is able to tolerate the diet.   does not remember any significant choking or significant cough coughing episode which was more than usual in the last couple of days but then told me that she actually coughs very frequently with each meal.  I did discuss with  and son but at this time aspiration pneumonia probably cannot be completely ruled out either.  Patient was found to have fever of 100.7 at home she also felt more  lethargic and fatigued and was brought to the emergency room for further evaluation and management she was concerned about having possible urinary tract infection and felt that maybe she is retaining the urine.  In the emergency room her urine analysis was normal urine cultures are sent but her chest x-ray was concerning for left lower lobe pneumonia.  She was a started on IV fluids Tylenol was given and she was also started on Rocephin and Zithromax by the ER attending while she was in the emergency room and patient was admitted for further evaluation and management.    From her inclusion body myositis point a few she is following closely with Dr. Garcia from neurology and at this time is compliant with her medications, she has some contracture in the lower extremities and does not have any strength and she is completely wheelchair-bound at this point she does have some the strength in her upper extremities she has mostly proximal muscle weakness.    Past Medical History    I have reviewed this patient's medical history and updated it with pertinent information if needed.   Past Medical History:   Diagnosis Date     Acid indigestion      Anxiety      Dysphagia      HTN, goal below 140/90      Hyperlipidaemia LDL goal < 100      Hypertension      IGT (impair glucose tolerance)      Inclusion body myositis      Spinal stenosis, lumbar      Tremor      Vertigo        Past Surgical History   I have reviewed this patient's surgical history and updated it with pertinent information if needed.  Past Surgical History:   Procedure Laterality Date     HYSTERECTOMY       HYSTERECTOMY SUPRACERVICAL, BILATERAL SALPINGO-OOPHORECTOMY, COMBINED      cystadenomaadenoma     JOINT REPLACEMENT       OPEN REDUCTION INTERNAL FIXATION TIBIA Right 1/27/2015    Procedure: OPEN REDUCTION INTERNAL FIXATION TIBIA;  Surgeon: Rocco Campbell MD;  Location:  OR     ORTHOPEDIC SURGERY  hip       Prior to Admission Medications   Prior to  Admission Medications   Prescriptions Last Dose Informant Patient Reported? Taking?   ALPRAZolam (XANAX) 0.25 MG tablet 8/21/2018 at x1 Self No Yes   Sig: Take 1 tablet (0.25 mg) by mouth 3 times daily   Methotrexate Sodium (METHOTREXATE PO) 8/17/2018 Self Yes Yes   Sig: Take 2.5 mg by mouth once a week 5 tabs = 12 mg wed or thur   VITAMIN D, CHOLECALCIFEROL, PO 8/21/2018 at Unknown time Self Yes Yes   Sig: Take 2,000 Units by mouth daily   calcium carb 1250 mg, 500 mg [place],/vitamin D 200 units (OSCAL WITH D) 500-200 MG-UNIT per tablet 8/21/2018 at x1 Self Yes Yes   Sig: Take 1 tablet by mouth 2 times daily (with meals)   ciclopirox (LOPROX) 0.77 % cream 8/21/2018 at x1 Self No Yes   Sig: APPLY EXTERNALLY TO THE AFFECTED AREA TWICE DAILY   cimetidine (TAGAMET) 400 MG tablet 8/20/2018 at Unknown time Self No Yes   Sig: TAKE 1 TABLET BY MOUTH AT BEDTIME   folic acid (FOLVITE) 1 MG tablet 8/21/2018 at x1 Self Yes Yes   Sig: Take 1 mg by mouth 3 times daily.   lisinopril (PRINIVIL/ZESTRIL) 2.5 MG tablet 8/21/2018 at Unknown time Self No Yes   Sig: TAKE 1 TABLET BY MOUTH DAILY   nystatin (MYCOSTATIN) 178503 UNIT/GM POWD prn Self No No   Sig: APPLY TOPICALLY TWICE DAILY AS NEEDED   primidone (MYSOLINE) 50 MG tablet 8/21/2018 at Unknown time Self Yes Yes   Sig: Take 50 mg by mouth every morning   primidone (MYSOLINE) 50 MG tablet 8/20/2018 at Unknown time Self Yes Yes   Sig: Take 50 mg by mouth daily (with lunch)   primidone (MYSOLINE) 50 MG tablet 8/20/2018 at Unknown time Self Yes Yes   Sig: Take 25 mg by mouth daily (with dinner)      Facility-Administered Medications: None     Allergies   No Known Allergies    Social History   I have reviewed this patient's social history and updated it with pertinent information if needed. Nelia Solano  reports that she has never smoked. She has never used smokeless tobacco. She reports that she drinks about 3.0 oz of alcohol per week  She reports that she does not use illicit  drugs.    Family History   I have reviewed this patient's family history and updated it with pertinent information if needed.   History reviewed. No pertinent family history.    Review of Systems   CONSTITUTIONAL:  positive for fatigue and generalized weakness.  EYES:  negative for blurred vision and visual disturbance  HEENT:  negative for hoarseness and voice change  RESPIRATORY:  negative for cough with sputum, dyspnea, wheezing and chest pain  CARDIOVASCULAR:  negative for chest pain, palpitations, orthopnea, exertional chest pressure/discomfort  GASTROINTESTINAL: Negative for abd pain, nausea , vomiting ,constipation and abdominal pain, continues to have issues with dysphagia with each meal as above   GENITOURINARY: Negative for burning /urgency and frequency.does not have retention feeling now   HEMATOLOGIC/LYMPHATIC:  negative  ALLERGIC/IMMUNOLOGIC:  negative for drug reactions  ENDOCRINE:  negative for diabetic symptoms including polyuria, polydipsia and weight loss  MUSCULOSKELETAL:  positive for arthralgias  NEUROLOGICAL:  long history of inclusion body myositis   BEHAVIOR/PSYCH: negative    Physical Exam   Temp: 100  F (37.8  C) Temp src: Temporal BP: 126/46 Pulse: 107   Resp: 14 SpO2: 93 % O2 Device: None (Room air)    Vital Signs with Ranges  Temp:  [100  F (37.8  C)] 100  F (37.8  C)  Pulse:  [107] 107  Resp:  [14] 14  BP: (126)/(46) 126/46  SpO2:  [93 %] 93 %  135 lbs 0 oz    Constitutional: Awake, alert,oriented to time, place and person ,slightly hard of hearing , cooperative, no apparent distress.Pleasent and cooperative , family present at the bedside (  and son)  Eyes: Conjunctiva and pupils examined and normal.  HEENT: Moist mucous membranes, normal dentition.  Respiratory: Clear to auscultation bilaterally, no crackles or wheezing.  Cardiovascular: Regular rate and rhythm, normal S1 and S2, and no murmur noted.  GI: Soft, non-distended, non-tender, normal bowel  sounds.  Lymph/Hematologic: No anterior cervical or supraclavicular adenopathy.  Skin: No rashes, no cyanosis, no edema.  Musculoskeletal: No joint swelling, erythema or tenderness.  Neurologic: Cranial nerves 2-12 intact although B/L pupils are poorly reactive to light , normal sensation.right ankle dorsiflexion is decreased with decreased motor strength in B/L lower extremities   Psychiatric: Alert, oriented to person, place and time, no obvious anxiety or depression.    Data   Data reviewed today:  I personally reviewed the EKG tracing showing NSR.    Recent Labs  Lab 08/21/18  1538   WBC 9.1   HGB 12.3   MCV 92         POTASSIUM 4.4   CHLORIDE 98   CO2 29   BUN 17   CR 0.46*   ANIONGAP 7   BRANDON 9.1   *   ALBUMIN 2.6*   PROTTOTAL 6.7*   BILITOTAL 0.4   ALKPHOS 148   ALT 47   AST 41   TROPI <0.015       Imaging:  Recent Results (from the past 24 hour(s))   XR Chest 2 Views    Narrative    XR CHEST 2 VW 8/21/2018 4:04 PM    HISTORY: Fever and cough.     COMPARISON: 11/20/2017    FINDINGS: Patchy airspace opacity in the lower left lung. Right lung  is relatively clear. No pneumothorax. Stable heart size.      Impression    IMPRESSION: Left lower lobe pneumonia.    ALIYHA APONTE MD

## 2018-08-21 NOTE — PHARMACY-ADMISSION MEDICATION HISTORY
Admission medication history interview status for the 8/21/2018  admission is complete. See EPIC admission navigator for prior to admission medications     Medication history source reliability:Moderate    Actions taken by pharmacist (provider contacted, etc): spoke with patient who was a moderate historian. Looked in surescripts to verify doses of meds, especially primidone.      Additional medication history information not noted on PTA med list :None    Medication reconciliation/reorder completed by provider prior to medication history? No    Time spent in this activity: 15 mins    Prior to Admission medications    Medication Sig Last Dose Taking? Auth Provider   ALPRAZolam (XANAX) 0.25 MG tablet Take 1 tablet (0.25 mg) by mouth 3 times daily 8/21/2018 at x1 Yes Thomas Roman MD   calcium carb 1250 mg, 500 mg [place],/vitamin D 200 units (OSCAL WITH D) 500-200 MG-UNIT per tablet Take 1 tablet by mouth 2 times daily (with meals) 8/21/2018 at x1 Yes Reported, Patient   ciclopirox (LOPROX) 0.77 % cream APPLY EXTERNALLY TO THE AFFECTED AREA TWICE DAILY 8/21/2018 at x1 Yes Pavan Kern MD   cimetidine (TAGAMET) 400 MG tablet TAKE 1 TABLET BY MOUTH AT BEDTIME 8/20/2018 at Unknown time Yes Pavan Kern MD   folic acid (FOLVITE) 1 MG tablet Take 1 mg by mouth 3 times daily. 8/21/2018 at x1 Yes Reported, Patient   lisinopril (PRINIVIL/ZESTRIL) 2.5 MG tablet TAKE 1 TABLET BY MOUTH DAILY 8/21/2018 at Unknown time Yes Pavan Kern MD   Methotrexate Sodium (METHOTREXATE PO) Take 2.5 mg by mouth once a week 5 tabs = 12 mg wed or thur 8/17/2018 Yes Reported, Patient   primidone (MYSOLINE) 50 MG tablet Take 50 mg by mouth every morning 8/21/2018 at Unknown time Yes Unknown, Entered By History   primidone (MYSOLINE) 50 MG tablet Take 50 mg by mouth daily (with lunch) 8/20/2018 at Unknown time Yes Unknown, Entered By History   primidone (MYSOLINE) 50 MG tablet Take 25 mg by mouth daily (with dinner)  8/20/2018 at Unknown time Yes Unknown, Entered By History   VITAMIN D, CHOLECALCIFEROL, PO Take 2,000 Units by mouth daily 8/21/2018 at Unknown time Yes Reported, Patient   nystatin (MYCOSTATIN) 356592 UNIT/GM POWD APPLY TOPICALLY TWICE DAILY AS NEEDED Pavan Tolentino MD Tiffany M. Reinitz, PharmD

## 2018-08-21 NOTE — ED NOTES
Bed: ED10  Expected date:   Expected time:   Means of arrival:   Comments:  Triage urinary retention .

## 2018-08-21 NOTE — IP AVS SNAPSHOT
[facility] [address] [facility]    [address] [place] [place] [address]    Phone:  [phone]                                       After Visit Summary   8/21/2018    Nelia Solano    MRN: [id number]           After Visit Summary Signature Page     I have received my discharge instructions, and my questions have been answered. I have discussed any challenges I see with this plan with the nurse or doctor.    ..........................................................................................................................................  Patient/Patient Representative Signature      ..........................................................................................................................................  Patient Representative Print Name and Relationship to Patient    ..................................................               ................................................  Date                                            Time    ..........................................................................................................................................  Reviewed by Signature/Title    ...................................................              ..............................................  Date                                                            Time          22EPIC Rev 08/18

## 2018-08-21 NOTE — ED PROVIDER NOTES
History     Chief Complaint:  Urinary Retention    HPI   Nelia Solano is a 89 year old female with a history of HTN, HLD who presents to the emergency department for evaluation of urinary retention. The patient's  reports that the patient has not urinated for the last couple of days. He also reports a fever of 100.7 F and some lethargy and fatigue but denies any altered mental status.    The patient here states that she is feeling alright. She indicates she has felt feverish intermittently for the past couple of days with some cough. The patient denies any sore throat, chest pain, abdominal pain, shortness of breath, back pain, or congestion. She further denies any recent UTIs, recent antibiotics taken, or history of urinary retention. She notes she has not taken any ibuprofen or Tylenol for her fever. She does have a history of difficulty swallowing.    Allergies:  NKDA     Medications:    Xanax  Loprox  Tagamet  Folvite  Lisinopril  Methotrexate  Mycostatin  Primidone     Past Medical History:    Acid indigestion  Anxiety  Dysphagia  HTN  HLD  IGT  Inclusion body myositis  Spinal stenosis, lumbar  Tremor   Anemia  Vertigo    Past Surgical History:    Hysterectomy, bilateral salpingo-oophorectomy  Joint replacement  Open reduction internal fixation tibia    Family History:    No past pertinent family history.    Social History:  Presents , son.  Never smoker.  Positive for alcohol use.   Marital Status:   [2]     Review of Systems   Constitutional: Positive for fatigue and fever.   HENT: Positive for trouble swallowing. Negative for congestion and sore throat.    Respiratory: Positive for cough. Negative for shortness of breath.    Cardiovascular: Negative for chest pain.   Gastrointestinal: Negative for abdominal pain.   Genitourinary: Positive for decreased urine volume and difficulty urinating.   Musculoskeletal: Negative for back pain.   All other systems reviewed and are  "negative.    Physical Exam     Patient Vitals for the past 24 hrs:   BP Temp Temp src Pulse Resp SpO2 Height Weight   08/21/18 1941 - - - - - 96 % - -   08/21/18 1809 97/48 97.6  F (36.4  C) Oral 85 15 91 % - -   08/21/18 1739 113/59 - - 83 - 91 % - -   08/21/18 1728 113/59 - - 89 - 91 % - -   08/21/18 1451 126/46 100  F (37.8  C) Temporal 107 14 93 % 1.575 m (5' 2\") 61.2 kg (135 lb)     Physical Exam  General: Alert, appears elderly and frail. Cooperative.     In mild distress  HEENT:  Head:  Atraumatic  Ears:  External ears are normal  Mouth/Throat:  Oropharynx is without erythema or exudate and mucous membranes are dry.   Eyes:   Conjunctivae normal and EOM are normal. No scleral icterus.   Neck:   Normal range of motion. Neck supple.  CV:  Tachycardic rate, regular rhythm, normal heart sounds and radial pulses are 2+ and symmetric.  No murmur.  Resp:  Breath sounds are clear bilaterally    Non-labored, no retractions or accessory muscle use  GI:  Abdomen is soft, no distension, no tenderness. No rebound or guarding.  No CVA tenderness bilaterally  MS:  Normal range of motion. No edema.    Normal strength in all 4 extremities.     Back atraumatic.    Skin:  Warm and dry.  No rash or lesions noted.  Neuro:  Alert. Normal strength.  GCS: 15  Psych:  Normal mood and affect.  Lymph: No anterior or posterior cervical lymphadenopathy noted.    Emergency Department Course     ECG:  Time: 1606  Vent. Rate 92 bpm. WA interval 156. QRS duration 80. QT/QTc 340/420. P-R-T axis 41 1 17. Normal sinus rhythm. Normal ECG. No significant change compared to EKG dated 1/26/15. Read time: 1612    Imaging:  Radiographic findings were communicated with the patient and family who voiced understanding of the findings.    XR Chest 2 views:   Left lower lobe pneumonia. As per radiology.     Laboratory:  UA with micro: Blood Trace, Albumin 10, RBC 6 (H), Mucous Present, Amorphous Crystals Few, o/w negative    CBC: WBC: 9.1, HGB: 12.3, PLT: " 213.  CMP: Glucose 119 (H), Creatinine 0.46 (L), Albumin 2.6 (L), Protein Total 6.7 (L), o/w WNL     Lactic acid: 2.0    Magnesium: 1.9    1538 Troponin I: <0.015    Urine culture pending.  Blood culture x2 pending.    Interventions:  1539 NS 1L IV BOLUS  1543 Tylenol 650 mg PO  1640 Rocephin 2 g IV  1712 Zithromax 500 mg IV    Emergency Department Course:  Nursing notes and vitals reviewed. 1529 I performed an exam of the patient as documented above.     EKG obtained in the ED, see results above.     The patient provided a urine sample here in the emergency department. This was sent for laboratory testing, findings above.     IV inserted. Medicine administered as documented above. Blood drawn. This was sent to the lab for further testing, results above.    The patient was sent for a XR Chest while in the emergency department, findings above.     1626 I rechecked the patient and discussed the results of her workup thus far.     Findings and plan explained to the Patient who consents to admission. Discussed the patient with Dr. Alfaro, who will admit the patient to an inpatient bed for further monitoring, evaluation, and treatment.    I personally reviewed the laboratory results with the Patient and answered all related questions prior to admittance.     Impression & Plan      CMS Diagnoses: The Lactic acid level is elevated due to dehydration, at this time there is no sign of severe sepsis or septic shock.       Medical Decision Making:  Patient is a 89-year-old female with a history of inclusion body myositis, hypertension, essential tremor, and anemia who presents with generalized weakness, as well as concerns for urinary retention.  Patient has had a fever over the last several days as well.  Patient does receive home care nursing at home, although presents today due to increasing weakness and this concerns for urinary retention.  Urinalysis does not show obvious signs of urinary tract infection although there are  some red blood cells and mucous.  There was history concerning for some mild shortness of breath and cough which has been increasing the last several days.  Chest x-ray concerning for a left lower lobe pneumonia.  Patient and family do describe that she has had some difficulty with swallowing over the last several years ever since she had a hysterectomy.  There is potential concern for aspiration pneumonia, although since she has been having swallowing difficulties for several years, I would have suspected multiple presentations for aspiration pneumonia since then.  Ultimately patient will be covered for community-acquired pneumonia with ceftriaxone and azithromycin.  Blood cultures were sent prior to antibiotic administration.  Due to patient's complex past medical history and comorbidities, she will be admitted under the care of Dr. Alfaro.  Patient and family were updated with need for admission as well as results of imaging studies.  All questions answered before admission.    Diagnosis:    ICD-10-CM    1. Pneumonia of left lower lobe due to infectious organism (H) J18.1    2. Weakness R53.1      Disposition:  Admitted to Dr. Alfaro    I, Facundo Mckeon, am serving as a scribe on 8/21/2018 at 3:32 PM to personally document services performed by Jose Higuera MD based on my observations and the provider's statements to me.     Facundo Mckeon  8/21/2018    EMERGENCY DEPARTMENT       Jose Higuera MD  08/21/18 [id number]

## 2018-08-21 NOTE — ED NOTES
"[facility]  ED Nurse Handoff Report    ED Chief complaint: Urinary Retention (pt lives at home with  with care assistants that come a few times a day , Son was called because staff thinks she has not urinated for some time and is retaining)      ED Diagnosis:   Final diagnoses:   Weakness   Pneumonia of left lower lobe due to infectious organism (H)       Code Status: Full Code    Allergies: No Known Allergies    Activity level - Baseline/Home:  Total Care  Transfer with sofia  Activity Level - Current:   Total Care     Needed?: No    Isolation: No  Infection: Not Applicable  Bariatric?: No    Vital Signs:   Vitals:    08/21/18 1451   BP: 126/46   Pulse: 107   Resp: 14   Temp: 100  F (37.8  C)   TempSrc: Temporal   SpO2: 93%   Weight: 61.2 kg (135 lb)   Height: 1.575 m (5' 2\")       Cardiac Rhythm: ,        Pain level:      Is this patient confused?: No   [place] - Suicide Severity Rating Scale Completed?  Yes  If yes, what color did the patient score?  White    Patient Report: Initial Complaint: Urinary retention  Focused Assessment: Patient presents to ED for urinary retention,  reports patient has been not voiding her normal amount, been more lethargic with intermittent fever at home. Also reports intermittent cough and been coughing after drinking clear fluid lately.   Tests Performed: labs, CXR  Abnormal Results:   Results for orders placed or performed during the hospital encounter of 08/21/18   XR Chest 2 Views    Narrative    XR CHEST 2 VW 8/21/2018 4:04 PM    HISTORY: Fever and cough.     COMPARISON: 11/20/2017    FINDINGS: Patchy airspace opacity in the lower left lung. Right lung  is relatively clear. No pneumothorax. Stable heart size.      Impression    IMPRESSION: Left lower lobe pneumonia.    ALIYAH APONTE MD   UA with Microscopic   Result Value Ref Range    Color Urine Yellow     Appearance Urine Slightly Cloudy     Glucose Urine Negative NEG^Negative mg/dL "    Bilirubin Urine Negative NEG^Negative    Ketones Urine Negative NEG^Negative mg/dL    Specific Gravity Urine 1.020 1.003 - 1.035    Blood Urine Trace (A) NEG^Negative    pH Urine 6.5 5.0 - 7.0 pH    Protein Albumin Urine 10 (A) NEG^Negative mg/dL    Urobilinogen mg/dL Normal 0.0 - 2.0 mg/dL    Nitrite Urine Negative NEG^Negative    Leukocyte Esterase Urine Negative NEG^Negative    Source Catheterized Urine     WBC Urine 2 0 - 5 /HPF    RBC Urine 6 (H) 0 - 2 /HPF    Squamous Epithelial /HPF Urine 1 0 - 1 /HPF    Mucous Urine Present (A) NEG^Negative /LPF    Hyaline Casts 1 0 - 2 /LPF    Amorphous Crystals Few (A) NEG^Negative /HPF   Lactic acid whole blood   Result Value Ref Range    Lactic Acid 2.0 0.7 - 2.0 mmol/L   Comprehensive metabolic panel   Result Value Ref Range    Sodium 134 133 - 144 mmol/L    Potassium 4.4 3.4 - 5.3 mmol/L    Chloride 98 94 - 109 mmol/L    Carbon Dioxide 29 20 - 32 mmol/L    Anion Gap 7 3 - 14 mmol/L    Glucose 119 (H) 70 - 99 mg/dL    Urea Nitrogen 17 7 - 30 mg/dL    Creatinine 0.46 (L) 0.52 - 1.04 mg/dL    GFR Estimate >90 >60 mL/min/1.7m2    GFR Estimate If Black >90 >60 mL/min/1.7m2    Calcium 9.1 8.5 - 10.1 mg/dL    Bilirubin Total 0.4 0.2 - 1.3 mg/dL    Albumin 2.6 (L) 3.4 - 5.0 g/dL    Protein Total 6.7 (L) 6.8 - 8.8 g/dL    Alkaline Phosphatase 148 40 - 150 U/L    ALT 47 0 - 50 U/L    AST 41 0 - 45 U/L   CBC with platelets differential   Result Value Ref Range    WBC 9.1 4.0 - 11.0 10e9/L    RBC Count 4.04 3.8 - 5.2 10e12/L    Hemoglobin 12.3 11.7 - 15.7 g/dL    Hematocrit 37.1 35.0 - 47.0 %    MCV 92 78 - 100 fl    MCH 30.4 26.5 - 33.0 pg    MCHC 33.2 31.5 - 36.5 g/dL    RDW 16.0 (H) 10.0 - 15.0 %    Platelet Count 213 150 - 450 10e9/L    Diff Method Automated Method     % Neutrophils 47.9 %    % Lymphocytes 23.5 %    % Monocytes 18.8 %    % Eosinophils 8.8 %    % Basophils 0.7 %    % Immature Granulocytes 0.3 %    Nucleated RBCs 0 0 /100    Absolute Neutrophil 4.3 1.6 - 8.3  10e9/L    Absolute Lymphocytes 2.1 0.8 - 5.3 10e9/L    Absolute Monocytes 1.7 (H) 0.0 - 1.3 10e9/L    Absolute Eosinophils 0.8 (H) 0.0 - 0.7 10e9/L    Absolute Basophils 0.1 0.0 - 0.2 10e9/L    Abs Immature Granulocytes 0.0 0 - 0.4 10e9/L    Absolute Nucleated RBC 0.0    Troponin I   Result Value Ref Range    Troponin I ES <0.015 0.000 - 0.045 ug/L   EKG 12-lead, tracing only   Result Value Ref Range    Interpretation ECG Click View Image link to view waveform and result        Treatments provided: NS bolus, Rocephin    Family Comments:  and son at bedside    OBS brochure/video discussed/provided to patient: N/A    ED Medications:   Medications   cefTRIAXone (ROCEPHIN) 2 g vial to attach to  ml bag for ADULTS or NS 50 ml bag for PEDS (2 g Intravenous New Bag 8/21/18 1640)   azithromycin (ZITHROMAX) 500 mg in sodium chloride 0.9 % 250 mL intermittent infusion (not administered)   0.9% sodium chloride BOLUS (0 mLs Intravenous Stopped 8/21/18 1640)   acetaminophen (TYLENOL) tablet 650 mg (650 mg Oral Given 8/21/18 1543)       Drips infusing?:  No    For the majority of the shift this patient was Green.   Interventions performed were none.    Severe Sepsis OR Septic Shock Diagnosis Present: No      ED NURSE PHONE NUMBER: [phone]

## 2018-08-22 ENCOUNTER — APPOINTMENT (OUTPATIENT)
Dept: SPEECH THERAPY | Facility: CLINIC | Age: 83
DRG: 179 | End: 2018-08-22
Attending: INTERNAL MEDICINE
Payer: MEDICARE

## 2018-08-22 LAB
ALBUMIN SERPL-MCNC: 2.2 G/DL (ref 3.4–5)
ALP SERPL-CCNC: 133 U/L (ref 40–150)
ALT SERPL W P-5'-P-CCNC: 38 U/L (ref 0–50)
ANION GAP SERPL CALCULATED.3IONS-SCNC: 8 MMOL/L (ref 3–14)
AST SERPL W P-5'-P-CCNC: 33 U/L (ref 0–45)
BACTERIA SPEC CULT: NO GROWTH
BILIRUB SERPL-MCNC: 0.3 MG/DL (ref 0.2–1.3)
BUN SERPL-MCNC: 10 MG/DL (ref 7–30)
CALCIUM SERPL-MCNC: 7.6 MG/DL (ref 8.5–10.1)
CHLORIDE SERPL-SCNC: 103 MMOL/L (ref 94–109)
CO2 SERPL-SCNC: 26 MMOL/L (ref 20–32)
CREAT SERPL-MCNC: 0.39 MG/DL (ref 0.52–1.04)
ERYTHROCYTE [DISTWIDTH] IN BLOOD BY AUTOMATED COUNT: 15.9 % (ref 10–15)
GFR SERPL CREATININE-BSD FRML MDRD: >90 ML/MIN/1.7M2
GLUCOSE SERPL-MCNC: 114 MG/DL (ref 70–99)
HCT VFR BLD AUTO: 37.5 % (ref 35–47)
HGB BLD-MCNC: 12.2 G/DL (ref 11.7–15.7)
Lab: NORMAL
MAGNESIUM SERPL-MCNC: 1.6 MG/DL (ref 1.6–2.3)
MCH RBC QN AUTO: 29.8 PG (ref 26.5–33)
MCHC RBC AUTO-ENTMCNC: 32.5 G/DL (ref 31.5–36.5)
MCV RBC AUTO: 92 FL (ref 78–100)
PLATELET # BLD AUTO: 205 10E9/L (ref 150–450)
POTASSIUM SERPL-SCNC: 3.9 MMOL/L (ref 3.4–5.3)
PROT SERPL-MCNC: 6 G/DL (ref 6.8–8.8)
RBC # BLD AUTO: 4.09 10E12/L (ref 3.8–5.2)
SODIUM SERPL-SCNC: 137 MMOL/L (ref 133–144)
SPECIMEN SOURCE: NORMAL
WBC # BLD AUTO: 6.8 10E9/L (ref 4–11)

## 2018-08-22 PROCEDURE — 94640 AIRWAY INHALATION TREATMENT: CPT | Mod: 76

## 2018-08-22 PROCEDURE — 83735 ASSAY OF MAGNESIUM: CPT | Performed by: INTERNAL MEDICINE

## 2018-08-22 PROCEDURE — A9270 NON-COVERED ITEM OR SERVICE: HCPCS | Mod: GY | Performed by: INTERNAL MEDICINE

## 2018-08-22 PROCEDURE — 85027 COMPLETE CBC AUTOMATED: CPT | Performed by: INTERNAL MEDICINE

## 2018-08-22 PROCEDURE — 25800025 ZZH RX 258: Performed by: INTERNAL MEDICINE

## 2018-08-22 PROCEDURE — 40000275 ZZH STATISTIC RCP TIME EA 10 MIN

## 2018-08-22 PROCEDURE — 25000125 ZZHC RX 250: Performed by: INTERNAL MEDICINE

## 2018-08-22 PROCEDURE — 80053 COMPREHEN METABOLIC PANEL: CPT | Performed by: INTERNAL MEDICINE

## 2018-08-22 PROCEDURE — 36415 COLL VENOUS BLD VENIPUNCTURE: CPT | Performed by: INTERNAL MEDICINE

## 2018-08-22 PROCEDURE — 25000132 ZZH RX MED GY IP 250 OP 250 PS 637: Mod: GY | Performed by: INTERNAL MEDICINE

## 2018-08-22 PROCEDURE — 40000225 ZZH STATISTIC SLP WARD VISIT: Performed by: SPEECH-LANGUAGE PATHOLOGIST

## 2018-08-22 PROCEDURE — 92610 EVALUATE SWALLOWING FUNCTION: CPT | Mod: GN | Performed by: SPEECH-LANGUAGE PATHOLOGIST

## 2018-08-22 PROCEDURE — 40000894 ZZH STATISTIC OT IP EVAL DEFER

## 2018-08-22 PROCEDURE — 94640 AIRWAY INHALATION TREATMENT: CPT

## 2018-08-22 PROCEDURE — 12000000 ZZH R&B MED SURG/OB

## 2018-08-22 PROCEDURE — 40000893 ZZH STATISTIC PT IP EVAL DEFER: Performed by: PHYSICAL THERAPIST

## 2018-08-22 PROCEDURE — 92526 ORAL FUNCTION THERAPY: CPT | Mod: GN | Performed by: SPEECH-LANGUAGE PATHOLOGIST

## 2018-08-22 PROCEDURE — 99233 SBSQ HOSP IP/OBS HIGH 50: CPT | Performed by: INTERNAL MEDICINE

## 2018-08-22 PROCEDURE — 25000128 H RX IP 250 OP 636: Performed by: INTERNAL MEDICINE

## 2018-08-22 RX ORDER — DEXTROSE MONOHYDRATE, SODIUM CHLORIDE, AND POTASSIUM CHLORIDE 50; 1.49; 4.5 G/1000ML; G/1000ML; G/1000ML
INJECTION, SOLUTION INTRAVENOUS CONTINUOUS
Status: DISCONTINUED | OUTPATIENT
Start: 2018-08-22 | End: 2018-08-25 | Stop reason: HOSPADM

## 2018-08-22 RX ADMIN — OYSTER SHELL CALCIUM WITH VITAMIN D 1 TABLET: 500; 200 TABLET, FILM COATED ORAL at 08:23

## 2018-08-22 RX ADMIN — IPRATROPIUM BROMIDE AND ALBUTEROL SULFATE 3 ML: .5; 3 SOLUTION RESPIRATORY (INHALATION) at 07:49

## 2018-08-22 RX ADMIN — LISINOPRIL 2.5 MG: 2.5 TABLET ORAL at 08:23

## 2018-08-22 RX ADMIN — POTASSIUM CHLORIDE, DEXTROSE MONOHYDRATE AND SODIUM CHLORIDE: 150; 5; 450 INJECTION, SOLUTION INTRAVENOUS at 15:58

## 2018-08-22 RX ADMIN — SENNOSIDES AND DOCUSATE SODIUM 1 TABLET: 8.6; 5 TABLET ORAL at 08:23

## 2018-08-22 RX ADMIN — IPRATROPIUM BROMIDE AND ALBUTEROL SULFATE 3 ML: .5; 3 SOLUTION RESPIRATORY (INHALATION) at 19:26

## 2018-08-22 RX ADMIN — MICONAZOLE NITRATE: 20 CREAM TOPICAL at 21:09

## 2018-08-22 RX ADMIN — IPRATROPIUM BROMIDE AND ALBUTEROL SULFATE 3 ML: .5; 3 SOLUTION RESPIRATORY (INHALATION) at 15:47

## 2018-08-22 RX ADMIN — IPRATROPIUM BROMIDE AND ALBUTEROL SULFATE 3 ML: .5; 3 SOLUTION RESPIRATORY (INHALATION) at 11:24

## 2018-08-22 RX ADMIN — FOLIC ACID 1 MG: 1 TABLET ORAL at 08:23

## 2018-08-22 RX ADMIN — AMPICILLIN SODIUM AND SULBACTAM SODIUM 3 G: 2; 1 INJECTION, POWDER, FOR SOLUTION INTRAMUSCULAR; INTRAVENOUS at 11:56

## 2018-08-22 RX ADMIN — VITAMIN D, TAB 1000IU (100/BT) 2000 UNITS: 25 TAB at 08:23

## 2018-08-22 RX ADMIN — AMPICILLIN SODIUM AND SULBACTAM SODIUM 3 G: 2; 1 INJECTION, POWDER, FOR SOLUTION INTRAMUSCULAR; INTRAVENOUS at 05:59

## 2018-08-22 RX ADMIN — AMPICILLIN SODIUM AND SULBACTAM SODIUM 3 G: 2; 1 INJECTION, POWDER, FOR SOLUTION INTRAMUSCULAR; INTRAVENOUS at 18:01

## 2018-08-22 RX ADMIN — PRIMIDONE 50 MG: 50 TABLET ORAL at 08:23

## 2018-08-22 RX ADMIN — ALPRAZOLAM 0.25 MG: 0.25 TABLET ORAL at 08:23

## 2018-08-22 RX ADMIN — AMPICILLIN SODIUM AND SULBACTAM SODIUM 3 G: 2; 1 INJECTION, POWDER, FOR SOLUTION INTRAMUSCULAR; INTRAVENOUS at 00:27

## 2018-08-22 ASSESSMENT — ACTIVITIES OF DAILY LIVING (ADL)
ADLS_ACUITY_SCORE: 31

## 2018-08-22 NOTE — PROGRESS NOTES
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 Garcia from [facility], chronic dysphasia has been seems to be giving her mechanical soft diet at home, spinal stenosis, chronic tremors and vertigo, wheelchair-bound as a baseline who was admitted from the emergency room with possible aspiration pneumonia and left lower lobe.     Aspiration pneumonia, left lower lobe  - activity as tolerated, patient has very limited mobility at home she is mostly wheelchair-bound and needs help in turning and repositioning in bed also.  - continue with Unasyn every 6 hours IV.  - IVF with dextrose and potassium @50cchr while NPO  - NPO and management of dysphagia as below  - blood cultures are no growth to date    Dysphagia:  It appears as patient has been having issue with dysphagia for long time and coughs with food intake even at home. Speech evaluated and recommend NPO and recommended cautiously to given essential meds if tolerated. RN attempted to give meds and patient coughing with every attempt and unable to swallow.   - discussed with pt and  regarding dysphagia, speech evaluation, and regarding alternative means of nutrition, they both are clear that patient DOES NOT want feeding tube and wants to eat by mouth and do what she was doing prior to coming to the hospital  - brought up the idea of Hospice, would like more information and interested in enrolling--> SW consult for hospice placed  - still full code--> attempt to re-address code status, but pt and  need help and could not decided, interested in meeting palliative care to discuss regarding code status  - currently keeping her NPO until code status further addressed  - also GI consulted to see if there is anything  from GI stand point that can be done for palliative purpose that will allow her to eat without signifcant coughing     Inclusion body myositis  Vertigo   Spinal stenosis, lumbar  Essential tremor  Patient is following closely with Dr. Garcia from [facility], [facility] and was last seen in December 2017.  Currently she is compliant with her medication and seems to be doing is stable from her myositis point of view  - hold all her oral meds now given significant issue with swallowing   - scheduled Ativan IV for her in place of her scheduled Xanax     Essential hypertension  - hold PO lisinopril    # Pain Assessment:  Current Pain Score 8/22/2018   Patient currently in pain? denies   Pain score (0-10) -   Pain location -   Pain descriptors -   Nelia thompson pain level was assessed and she currently denies pain.      DVT Prophylaxis: Pneumatic Compression Devices  Code Status: Full Code    Disposition: Expected discharge in 1-2 days once GI evaluated, palliative and hospice meeting completed    40 minutes spent with >50% of time spent in counseling/coordinating care    Ana Cristina Valdes MD  Text Page  (7am to 6pm)    Interval History   Patient unable to swallow pills today due to significant cough. Currently NPO.   Met with patient and her , hand long discussion with both of them regarding her dysphagia and aspiration pneumonia.   They are clear that she does not want feeding tube and would want to eat orally as she has been doing before coming to the hospital. They are interested in hospice.   Unable to decided about code status since currently full code    -Data reviewed today: I reviewed all new labs and imaging results over the last 24 hours. I personally reviewed the chest x-ray image(s) showing LLL infiltrate.    Physical Exam   Temp: 99.3  F (37.4  C) Temp src: Oral BP: 142/77 Pulse: 98   Resp: 20 SpO2: 95 % O2 Device: Nasal cannula Oxygen Delivery: 2 LPM  Vitals:    08/21/18 1451 08/22/18  0700   Weight: 61.2 kg (135 lb) 70.1 kg (154 lb 8.7 oz)     Vital Signs with Ranges  Temp:  [97.6  F (36.4  C)-100  F (37.8  C)] 99.3  F (37.4  C)  Pulse:  [] 98  Resp:  [14-20] 20  BP: ()/(46-77) 142/77  SpO2:  [91 %-97 %] 95 %  I/O last 3 completed shifts:  In: 1350 [IV Piggyback:1350]  Out: 150 [Urine:150]    Constitutional: Alert, awake and no apparent distress  Respiratory: Left basilar crackle, right lung clear and no wheezing  Cardiovascular:regular rate and rhythm  GI: soft and non-tender  Skin/Integumen: warm and dry  Other:      Medications       ALPRAZolam  0.25 mg Oral TID     ampicillin-sulbactam (UNASYN) IV  3 g Intravenous Q6H     Calcium carb-Vitamin D 500 mg [place]-200 units  1 tablet Oral BID w/meals     cholecalciferol  2,000 Units Oral Daily     cimetidine  400 mg Oral At Bedtime     folic acid  1 mg Oral TID     ipratropium - albuterol 0.5 mg/2.5 mg/3 mL  3 mL Nebulization 4x Daily     lisinopril  2.5 mg Oral Daily     [START ON 8/24/2018] methotrexate tablet CHEMO 12.5 mg  12.5 mg Oral Weekly     miconazole   Topical BID     primidone  25 mg Oral Daily with supper     primidone  50 mg Oral QAM     primidone  50 mg Oral Daily with lunch     senna-docusate  1 tablet Oral BID    Or     senna-docusate  2 tablet Oral BID     sodium chloride (PF)  3 mL Intracatheter Q8H       Data     Recent Labs  Lab 08/22/18  0846 08/21/18  1538   WBC 6.8 9.1   HGB 12.2 12.3   MCV 92 92    213    134   POTASSIUM 3.9 4.4   CHLORIDE 103 98   CO2 26 29   BUN 10 17   CR 0.39* 0.46*   ANIONGAP 8 7   BRANDON 7.6* 9.1   * 119*   ALBUMIN 2.2* 2.6*   PROTTOTAL 6.0* 6.7*   BILITOTAL 0.3 0.4   ALKPHOS 133 148   ALT 38 47   AST 33 41   TROPI  --  <0.015       Recent Results (from the past 24 hour(s))   XR Chest 2 Views    Narrative    XR CHEST 2 VW 8/21/2018 4:04 PM    HISTORY: Fever and cough.     COMPARISON: 11/20/2017    FINDINGS: Patchy airspace opacity in the lower left lung. Right lung  is  relatively clear. No pneumothorax. Stable heart size.      Impression    IMPRESSION: Left lower lobe pneumonia.    ALIYAH APONTE MD

## 2018-08-22 NOTE — PLAN OF CARE
Problem: Patient Care Overview  Goal: Plan of Care/Patient Progress Review  Discharge Planner OT   Patient plan for discharge: Return to home with continued A    Current status: Order received and chart reviewed. Confirmed PLOF and home set up information with PT earlier today. Pt is WC bound at home where she receives A for all ADLs.     Barriers to return to prior living situation: None anticipated    Recommendations for discharge: Return home with continued A    Rationale for recommendations: No concerns regarding discharge home as pt has current A for ADLs and continued to receive A. Pt does not demonstrate skilled OT needs during hospitalization. No needs anticipated following discharge home.        Entered by: Terra Garcia 08/22/2018 12:18 PM

## 2018-08-22 NOTE — PLAN OF CARE
Problem: Patient Care Overview  Goal: Plan of Care/Patient Progress Review  Discharge Planner PT   Patient plan for discharge: Return home  Current status: PT orders received, chart reviewed, discussed with pt. Pt here with suspected aspiration pneumonitis and LLE pneumonia. Pt lives with her spouse at baseline, w/c bound with sofia lift for mobility. Pt states she does not stand or ambulate. Spouse and home nursing staff assist with all ADLs/IADLs. Pt does need assist with turning/repositioning at home which will be deferred to nursing while IP.  Barriers to return to prior living situation: None anticipated for return home with assist/services as prior  Recommendations for discharge: Return home  Rationale for recommendations: Pt requires use of lift at baseline and has assist for all ADLs/IADLs; turning/repositioning will be deferred to nursing while IP. Pt does not present with skilled IP PT needs at this time. Orders completed. Discussed with RN/CC.       Entered by: Diamante Membreno 08/22/2018 8:17 AM

## 2018-08-22 NOTE — PLAN OF CARE
Problem: Patient Care Overview  Goal: Plan of Care/Patient Progress Review  Outcome: No Change  Alert & oriented, forgetful, total care, turned & repositioned, 2 assist with lift, wheel chair bound baseline,incontinence, VSS on 2 liters oxygen, [place], lung sound diminished with crackles in lower lobes, tele NSR, L heel wound scabbing, R great toe with black spot, on IV ampicillin, RN will continue to monitor.

## 2018-08-22 NOTE — PROGRESS NOTES
08/22/18 0941   General Information   Onset Date 08/21/18   Start of Care Date 08/22/18   Referring Physician Dr. 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 Garcia from [facility], chronic dysphasia has been seems to be giving her mechanical soft diet at home, spinal stenosis, chronic tremors and vertigo, wheelchair-bound as a baseline who was admitted from the emergency room with possible aspiration pneumonia and left lower lobe. Video in 2011 revealed moderate to severe dysphagia with prominent cricopharyngeal bar noted, with significant pharyngeal esophageal retention.    Clinical Swallow Evaluation   Oral Musculature anomalies present   Structural Abnormalities none present  (Tremors)   Dentition present and adequate   Secretion Management problems swallowing secretions  (Increased secretions. )   Mucosal Quality adequate   Mandibular Strength and Mobility impaired   Oral Labial Strength and Mobility WFL   Lingual Strength and Mobility impaired protrusion;impaired anterior elevation;impaired left lateral movement;impaired right lateral movement;impaired coordination  (Tremors)   Velar Elevation intact   Buccal Strength and Mobility intact   Laryngeal Function Cough;Throat clear;Swallow;Voicing initiated;Dry swallow palpated   Oral Musculature Comments Moderately impaired.    Additional Documentation Yes   Clinical Swallow Eval: Thin Liquid Texture Trial   Mode of Presentation, Thin Liquids cup;spoon;fed by  clinician;self-fed   Volume of Liquid or Food Presented ice chips and small sips of water.    Oral Phase of Swallow Premature pharyngeal entry;Poor AP movement   Pharyngeal Phase of Swallow impaired;coughing/choking;repeated swallows;wet vocal quality after swallow;reduction in laryngeal movement   Diagnostic Statement Sx of aspiration via the cup.    Clinical Swallow Eval: Nectar Thick Liquid Texture Trial   Mode of Presentation, Nectar spoon;fed by clinician;self-fed   Volume of Nectar Presented 5 swallows   Oral Phase, Nectar Poor AP movement;Premature pharyngeal entry   Pharyngeal Phase, Nectar impaired;reduction in laryngeal movement;repeated swallows;throat clearing;wet vocal quality after swallow   Diagnostic Statement Sx of aspiration   Clinical Swallow Eval: Puree Solid Texture Trial   Mode of Presentation, Puree spoon;fed by clinician   Volume of Puree Presented 1/4 teaspoons of apple sauce   Oral Phase, Puree Poor AP movement;Premature pharyngeal entry;Residue in oral cavity   Oral Residue, Puree mid posterior tongue   Pharyngeal Phase, Puree impaired;reduction in laryngeal movement;repeated swallows;wet vocal quality after swallow   Diagnostic Statement Sx of aspiration.   Esophageal Phase of Swallow   Patient reports or presents with symptoms of esophageal dysphagia Yes   Esophageal comments Poor motility and large crico pharyngeal bar.    Swallow Eval: Clinical Impressions   Skilled Criteria for Therapy Intervention Skilled criteria met.  Treatment indicated.   Functional Assessment Scale (FAS) 2   Treatment Diagnosis Moderate to severe oral, pharyngeal and esophageal dysphagia   Diet texture recommendations NPO  (Except for trmeor medications. )   Recommended Feeding/Eating Techniques hard swallow w/ each bite or sip;no straws;small sips/bites;maintain upright posture during/after eating for 30 mins   Therapy Frequency daily   Predicted Duration of Therapy Intervention (days/wks) 1 week   Anticipated  Discharge Disposition home w/ home health   Risks and Benefits of Treatment have been explained. Yes   Patient, family and/or staff in agreement with Plan of Care Yes   Clinical Impression Comments Patient presents with moderate to severe oral and pharyngeal dysphagia with esophageal dysfunction per video in 2011, with large cricopharyngeal bar and pharyngeal esophageal retention was significant likely the cause of her current pneumonia. Patient demonstrating overt Sx of aspiration with thin liquids via the cup. Tolerated single small ice chips very limited. Sx of aspiration with nectar thick liquids after 4 teaspoon amounts and via the cup. Due to decreased laryngeal elevation, delayed swallow andf wet vocal quality and cough. She has poor bolus control and AP movement with increased wetness and delayed cough with 1/4 teaspoon amount of apple sauce. Patient with a long standing hsitory of dysphagia that appears to worse and likely boarderline safe to eat/drink. Patient currently a full code not sure how agrressive /patient want to be. Recommend: 1. NPO except for her medications for tremors as tolerated crushed and place in a small teaspoon amount of thin or nectar thick liquids. 2. Repeat study maybe indicated once I speak with  and MD.    Total Evaluation Time   Total Evaluation Time (Minutes) 20

## 2018-08-22 NOTE — PLAN OF CARE
Problem: Patient Care Overview  Goal: Plan of Care/Patient Progress Review  Outcome: No Change  Admission    Patient arrives to room 605-2 via cart from ED.  Care plan note: AOx4, forgetful, [place]. Total care, WC bound, and lift dependant. Tele SR. VSS, afebrile, 2L NC. Denies pain. UEs tremors, not new. Turned q2h.LS diminished, LLE crackles. incont of b/b. Failed swallow test by RN, 2/2 frequent coughing. SPL consult pending. On IVF and IV abx. Nursing continues to monitor.      Inpatient nursing criteria listed below were met:    PCD's Documented: Yes  Skin issues/needs documented :Yes  Isolation education started/completed NA  Patient allergies verified with patient: Yes  Verified completion of [place] Risk Assessment Tool:  Yes  Verified completion of Guardianship screening tool: Yes  Fall Prevention: Care plan updated, Education given and documented Yes  Care Plan initiated: Yes  Home medications documented in belongings flowsheet: NA  Patient belongings documented in belongings flowsheet: Yes  Reminder note (belongings/ medications) placed in discharge instructions:NA  Admission profile/ required documentation complete: Yes

## 2018-08-22 NOTE — PROGRESS NOTES
GI NOTE    The most important determinant will be the speech eval. If patient with aspiration with liquids and foods then the feeding tube will be her only option.  Dilating or stenting the distal esophagus will not change the oropharyngeal swallowing mechanics.    Will have full consult available in AM  Await swallowing eval. And palliative care consult    Maria Fernanda Good MD  [facility]  Office

## 2018-08-22 NOTE — PLAN OF CARE
Problem: Patient Care Overview  Goal: Plan of Care/Patient Progress Review  Discharge Planner SLP   Patient plan for discharge: Patient did not state.   Current status: Bedside swallow evaluation completed. Patient presents with moderate to severe oral and pharyngeal dysphagia with esophageal dysfunction per video in 2011, with large cricopharyngeal bar and pharyngeal esophageal retention was significant likely the cause of her current pneumonia.(Previously tolerated thin/nectar thick liquids without aspiration per 2011 video, now with overt Sx of aspiration). Patient demonstrating overt Sx of aspiration with thin liquids via the cup. Tolerated single small ice chips very limited. Sx of aspiration with nectar thick liquids after 4 teaspoon amounts and via the cup. Due to decreased laryngeal elevation, delayed swallow and wet vocal quality and cough. She has poor bolus control and AP movement with increased wetness and delayed cough with 1/4 teaspoon amount of apple sauce. Patient with a long standing hsitory of dysphagia that appears to be worse (oral/pharyngeal phase) and likely boarderline safe to eat/drink. Patient currently a full code not sure how agrressive /patient want to be. Recommend: 1. NPO except for her medications for tremors as tolerated crushed and place in a small teaspoon amount of thin or nectar thick liquids. 2. Repeat study maybe indicated once I speak with  and MD.   Barriers to return to prior living situation: None  Recommendations for discharge: Pending outcome anticipate home with services.   Rationale for recommendations: Home with services and .        Entered by: Cece Givens 08/22/2018 10:36 AM

## 2018-08-22 NOTE — PLAN OF CARE
Problem: Patient Care Overview  Goal: Plan of Care/Patient Progress Review  Outcome: No Change  Pt A&O x3, forgetful, [place], total care, up to chair x2. Turn repo q 2 hours, low grade fevers, weaned from O2, 94% on room air. Attempted to give pt meds crushed in applesauce this AM/ eat breakfast, pt repeated coughing/ choking, unable to ,finish taking medications, per speech pt needs to be NPO, except for meds, attempted to give noon meds, unable dt pt choking. MD aware. LLE _2 edema, elevated on pillows. Palliative care consult, NPO until code status decided. Baseline tremors. Blood cultures pending. DC pending.

## 2018-08-23 PROCEDURE — 94640 AIRWAY INHALATION TREATMENT: CPT | Mod: 76

## 2018-08-23 PROCEDURE — 40000275 ZZH STATISTIC RCP TIME EA 10 MIN

## 2018-08-23 PROCEDURE — 99207 ZZC CDG-MDM COMPONENT: MEETS MODERATE - UP CODED: CPT | Performed by: INTERNAL MEDICINE

## 2018-08-23 PROCEDURE — 25000125 ZZHC RX 250: Performed by: INTERNAL MEDICINE

## 2018-08-23 PROCEDURE — 25000128 H RX IP 250 OP 636: Performed by: INTERNAL MEDICINE

## 2018-08-23 PROCEDURE — 25000132 ZZH RX MED GY IP 250 OP 250 PS 637: Mod: GY | Performed by: INTERNAL MEDICINE

## 2018-08-23 PROCEDURE — A9270 NON-COVERED ITEM OR SERVICE: HCPCS | Mod: GY | Performed by: INTERNAL MEDICINE

## 2018-08-23 PROCEDURE — 94640 AIRWAY INHALATION TREATMENT: CPT

## 2018-08-23 PROCEDURE — 99356 ZZC PROLONGED SERV,INPATIENT,1ST HR: CPT | Performed by: NURSE PRACTITIONER

## 2018-08-23 PROCEDURE — 99223 1ST HOSP IP/OBS HIGH 75: CPT | Performed by: NURSE PRACTITIONER

## 2018-08-23 PROCEDURE — 25800025 ZZH RX 258: Performed by: INTERNAL MEDICINE

## 2018-08-23 PROCEDURE — 99233 SBSQ HOSP IP/OBS HIGH 50: CPT | Performed by: INTERNAL MEDICINE

## 2018-08-23 PROCEDURE — 12000000 ZZH R&B MED SURG/OB

## 2018-08-23 RX ADMIN — IPRATROPIUM BROMIDE AND ALBUTEROL SULFATE 3 ML: .5; 3 SOLUTION RESPIRATORY (INHALATION) at 11:03

## 2018-08-23 RX ADMIN — MICONAZOLE NITRATE: 20 CREAM TOPICAL at 08:00

## 2018-08-23 RX ADMIN — AMPICILLIN SODIUM AND SULBACTAM SODIUM 3 G: 2; 1 INJECTION, POWDER, FOR SOLUTION INTRAMUSCULAR; INTRAVENOUS at 17:51

## 2018-08-23 RX ADMIN — POTASSIUM CHLORIDE, DEXTROSE MONOHYDRATE AND SODIUM CHLORIDE: 150; 5; 450 INJECTION, SOLUTION INTRAVENOUS at 20:19

## 2018-08-23 RX ADMIN — AMPICILLIN SODIUM AND SULBACTAM SODIUM 3 G: 2; 1 INJECTION, POWDER, FOR SOLUTION INTRAMUSCULAR; INTRAVENOUS at 06:49

## 2018-08-23 RX ADMIN — IPRATROPIUM BROMIDE AND ALBUTEROL SULFATE 3 ML: .5; 3 SOLUTION RESPIRATORY (INHALATION) at 15:21

## 2018-08-23 RX ADMIN — MICONAZOLE NITRATE: 20 CREAM TOPICAL at 23:28

## 2018-08-23 RX ADMIN — Medication 25 MG: at 17:51

## 2018-08-23 RX ADMIN — AMPICILLIN SODIUM AND SULBACTAM SODIUM 3 G: 2; 1 INJECTION, POWDER, FOR SOLUTION INTRAMUSCULAR; INTRAVENOUS at 13:13

## 2018-08-23 RX ADMIN — ALPRAZOLAM 0.25 MG: 0.25 TABLET ORAL at 17:51

## 2018-08-23 RX ADMIN — IPRATROPIUM BROMIDE AND ALBUTEROL SULFATE 3 ML: .5; 3 SOLUTION RESPIRATORY (INHALATION) at 07:59

## 2018-08-23 RX ADMIN — AMPICILLIN SODIUM AND SULBACTAM SODIUM 3 G: 2; 1 INJECTION, POWDER, FOR SOLUTION INTRAMUSCULAR; INTRAVENOUS at 00:44

## 2018-08-23 ASSESSMENT — ACTIVITIES OF DAILY LIVING (ADL)
ADLS_ACUITY_SCORE: 33
ADLS_ACUITY_SCORE: 31

## 2018-08-23 NOTE — PLAN OF CARE
Problem: Patient Care Overview  Goal: Plan of Care/Patient Progress Review  Outcome: No Change  A&O X 3, disoriented to time and forgetful, hard of hearing, total care, turn & reposition, 2 assist with mechanical lift, denies pain, vital signs stable on room air except tachy, lung sound crackles, incontinence, baseline tremors, tele ST, scheduled PO med not giving due to patient coughing and chocking, palliative care consult tomorrow.

## 2018-08-23 NOTE — PROGRESS NOTES
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 Garcia from [facility], chronic dysphasia has been seems to be giving her mechanical soft diet at home, spinal stenosis, chronic tremors and vertigo, wheelchair-bound as a baseline who was admitted from the emergency room with possible aspiration pneumonia and left lower lobe.     Aspiration pneumonia, left lower lobe  Dysphagia  Patient has been having issues with dysphagia for long time and coughs with food intake even at home. Patient likely had an aspiration event with pneumonia.  Speech evaluated and recommend NPO and recommended cautiously to given essential meds if tolerated. RN attempted to give meds and patient coughing with every attempt and unable to swallow.   - discussed with pt and  regarding dysphagia, speech evaluation, and regarding alternative means of nutrition, they both are clear that patient DOES NOT want feeding tube and wants to eat by mouth and do what she was doing prior to coming to the hospital  - Continue with Unasyn every 6 hours IV.  - IVF at 50 mL/hr   - Currently NPO and speech following  - Palliative care consulted to discuss goals of care.  Likely moving towards hospice and will be meeting with them tomorrow to discuss further       Inclusion body myositis  Vertigo   Spinal stenosis, lumbar  Essential tremor  Patient is following closely with Dr. Garcia from [facility] and was last seen in December 2017.  Currently she is compliant with her medication and seems to be doing is stable from her myositis point of view  - Holding all her oral meds now given significant issue with swallowing   - Scheduled Ativan IV for her in place of her scheduled Xanax     Essential  hypertension  - Holding PO lisinopril    # Pain Assessment:  Current Pain Score 8/23/2018   Patient currently in pain? denies   Pain score (0-10) -   Pain location -   Pain descriptors -   Nelia thompson pain level was assessed and she currently denies pain.      DVT Prophylaxis: Pneumatic Compression Devices  Code Status: Full Code    Disposition: Expected discharge TBD, but likely going to be going to hospice    Hussein Roman DO  Text Page (7am to 6pm)    Interval History   Patient seen and examined.  No pain currently.  Would like to eat if possible.  No fevers or chills.     -Data reviewed today: I reviewed all new labs and imaging results over the last 24 hours. I personally reviewed no images or EKG's today.    Physical Exam   Temp: 98.8  F (37.1  C) Temp src: Oral BP: 119/62 Pulse: 87   Resp: 18 SpO2: 91 % O2 Device: None (Room air)    Vitals:    08/21/18 1451 08/22/18 0700 08/23/18 0611   Weight: 61.2 kg (135 lb) 70.1 kg (154 lb 8.7 oz) 69.9 kg (154 lb 1.6 oz)     Vital Signs with Ranges  Temp:  [98.4  F (36.9  C)-99.5  F (37.5  C)] 98.8  F (37.1  C)  Pulse:  [] 87  Resp:  [18] 18  BP: ()/(55-62) 119/62  SpO2:  [88 %-96 %] 91 %  I/O last 3 completed shifts:  In: 928 [I.V.:928]  Out: -     Constitutional: Awake, cooperative, no apparent distress  Respiratory: Clear to auscultation bilaterally, no crackles or wheezing  Cardiovascular: Regular rate and rhythm, normal S1 and S2, and no murmur noted  GI: Soft, non-distended, non-tender  Skin/Integumen: No rashes, no cyanosis  MSK: No edema     Medications     dextrose 5% and 0.45% NaCl + KCl 20 mEq/L Stopped (08/23/18 0048)       ALPRAZolam  0.25 mg Oral TID     ampicillin-sulbactam (UNASYN) IV  3 g Intravenous Q6H     Calcium carb-Vitamin D 500 mg "[employer]-200 units  1 tablet Oral BID w/meals     cholecalciferol  2,000 Units Oral Daily     cimetidine  400 mg Oral At Bedtime     folic acid  1 mg Oral TID     ipratropium - albuterol 0.5 mg/2.5 mg/3 mL  3  mL Nebulization 4x Daily     lisinopril  2.5 mg Oral Daily     [START ON 8/24/2018] methotrexate tablet CHEMO 12.5 mg  12.5 mg Oral Weekly     miconazole   Topical BID     primidone  25 mg Oral Daily with supper     primidone  50 mg Oral QAM     primidone  50 mg Oral Daily with lunch     senna-docusate  1 tablet Oral BID    Or     senna-docusate  2 tablet Oral BID     sodium chloride (PF)  3 mL Intracatheter Q8H       Data     Recent Labs  Lab 08/22/18  0846 08/21/18  1538   WBC 6.8 9.1   HGB 12.2 12.3   MCV 92 92    213    134   POTASSIUM 3.9 4.4   CHLORIDE 103 98   CO2 26 29   BUN 10 17   CR 0.39* 0.46*   ANIONGAP 8 7   BRANDON 7.6* 9.1   * 119*   ALBUMIN 2.2* 2.6*   PROTTOTAL 6.0* 6.7*   BILITOTAL 0.3 0.4   ALKPHOS 133 148   ALT 38 47   AST 33 41   TROPI  --  <0.015       Imaging:   No results found for this or any previous visit (from the past 24 hour(s)).

## 2018-08-23 NOTE — PLAN OF CARE
"Problem: Patient Care Overview  Goal: Plan of Care/Patient Progress Review  Outcome: No Change  A&O X 3, disoriented to situation stating she \"did not know why she was here.\" Forgetful and hard of hearing. VSS on RA, tachy at times. Total care, turn & reposition, 2 assist with mechanical lift. Blanchable redness noted on left buttocks, mepilex applied. Denies pain, vital signs stable on room air. Incontinence. Baseline tremors. Tele ST. Oral medications held d/t high aspiration risk, tried small amount of plain applesauce causing patient to cough almost instantly. Palliative meeting performed with patient and family today at bedside. DC pending family decisions and placement. Nursing will continue to follow.       "

## 2018-08-23 NOTE — CONSULTS
[facility]    Palliative Care Consultation     Nelia Solano  MRN# [id number]  Date of Admission:  8/21/2018  Date of Service (when I saw the patient): 08/23/18  Reason for consult: Consulted by Dr. Valdes for Goals of care, Patient and family support    Assessment & Plan   Nelia Solano is a 89 year old female with PMH significant for inclusion body myositis, chronic dysphagia, chronic tremors, HTN, HLD, and spinal stenosis who presents with fever and lethargy, found to have aspiration PNA. She was evaluated by SLP who notes her severe dysphagia, now NPO. We are consulted for goals of care and pt and family support.     Symptoms/Recommendations   -I will return on Friday 8/24 at 1300 to finalize a care plan with pt. Family appear to be heavily leaning toward home with hospice (ideally on Saturday), no feeding tube, eating and drinking for pleasure (wanting the recommendation from SLP for safest diet possible), and DNR/DNI. Tentatively have arranged a hospice meeting with [facility] hospice liaison for Friday 8/24 at 1400, after I am able to confirm the plan with pt and family    Support/Coping  -Well supported by  Yao of 60+ years. Together, they raised 6 children, all of whom live local  -Pt is Jehovah's witness. Appreciate support from Palliative Chaplain resident, Demarcus Hernandez    Decisional Support, Goals of Care, Counseling & Coordination  Decisional Capacity Intact?  -Not independently. Reasonable to involve her in the decision making, but benefits from having the support of her family present for complex medical decision making   Health Care Directive on File?  -No, but  Yao reports having one at home. Asked him to bring it in to be scanned into our system   Code Status/Resuscitation Preferences?  -Remains full code. Recommended DNR/DNI today     Discussion  Visited with Nelia, along with  Yao, and 2 of her 6 adult children, Brenda and Mario this AM. Introduced the scope of our  "practice to Nelia and family. Discussed our potential roles for symptom management, support/coping, and decisional support (aka goals of care).     Together we discussed Nelia's history of [employer] and the progressive nature of her disease. She was diagnosed several years ago, but likely has had sx for the last 7 years. We discuss the progressive nature of her dysphagia and the implications that carries moving forward. I express worry that this is a terminal condition and she is likely to have shortened time to live from this.     We discuss options for her nutrition status moving forward. We discuss a feeding tube, including the benefits (ongoing nutrition support to possibly prolong time to live) and benefits (mild discomfort, bleeding risk at time of insertion, infection, ongoing aspiration risk, abd pain, N/V/D, pulling the tube out).     We discuss allowing her to eat and drink the best she can, with a modified textured diet. We recognize that aspiration will likely occur again, at what frequency is uncertain. We recognize that this can lead to breathing difficulty, PNA, breathing failure, and potentially death.     Within this discussion, we talk about the option of hospice as a way to avoid coming back and forth to the hospital for an unfixable situation. I educated patient and family regarding hospice philosophy and prognostic criteria. Dispelled common myths. Discussed what hospice is (and is not), what services are usually provided (and those that are not, ex \"group home care\"), under what circumstances people tend to enroll, and the variety of places people can get hospice care (along with subsequent financial implications). We spent a great deal of time talking about specific scenarios and how we would manage dysphagia, aspiration, and air hunger at home. It took a while for  Yao to grasp how he will manage her care with hospice at home, but he came to a point of believing he can do it. Family was " very supportive.     We then discussed code status in detail. I educated regarding the pros and cons of attempting cardiac resuscitation. Discussed probability of survival as well as quality of life implications. I educated regarding the pros and cons of intubation and mechanical ventilation. Discussed probability of survival as well as quality of life implications. I strongly recommended DNR/DNI and helped family understand the importance of this decision with the context of the other decisions, and ensuring there is congruence and cohesion within the plan. They expressed understanding.     We discussed typical anticipated timing of discharge. Based on this discussion, family appears to be leaning toward discharging home with hospice, ideally Saturday. We talked about basic logistics of formulating this plan for discharge.     They would like more time to make a final decision, and we thus agree to reconvene on Friday 8/24 at 1300 to make a final decision.     Nelia was able to track most of the beginning of the conversation and was appropriately tearful, yet very overwhelmed. I am not positive she tracked much of the remaining of the conversation. It is likely that her family will assist her in making a decision, as I am not certain she can formulate this plan independently.     Case reviewed with bedside RN, unit care coordinator Dr. Abel Mireles, and hospice liaison Aline Greco.     Thank you for involving us in the care of this patient and family. We will continue to follow. Please do not hesitate to contact me with questions or concerns or the on-call provider for our team if evening or weekend.    Lea MARROQUIN, [facility]  Palliative Medicine   Pager [phone]    Attestation:  Total time on the floor involved in the patient's care: 140 minutes (120 minutes in direct face to face counseling from 2418-1531)  Total time spent in counseling/care coordination: >50%    Chief Complaint   Fever,  lethargy    History is obtained from the patient, staff, family and extensive chart review.     Past Medical History    I have reviewed this patient's medical history and updated it with pertinent information if needed.   Past Medical History:   Diagnosis Date     Acid indigestion      Anxiety      Dysphagia      HTN, goal below 140/90      Hyperlipidaemia LDL goal < 100      Hypertension      IGT (impair glucose tolerance)      Inclusion body myositis      Spinal stenosis, lumbar      Tremor      Vertigo        Past Surgical History   I have reviewed this patient's surgical history and updated it with pertinent information if needed.  Past Surgical History:   Procedure Laterality Date     HYSTERECTOMY       HYSTERECTOMY SUPRACERVICAL, BILATERAL SALPINGO-OOPHORECTOMY, COMBINED      cystadenomaadenoma     JOINT REPLACEMENT       OPEN REDUCTION INTERNAL FIXATION TIBIA Right 1/27/2015    Procedure: OPEN REDUCTION INTERNAL FIXATION TIBIA;  Surgeon: Rocco Campbell MD;  Location:  OR     ORTHOPEDIC SURGERY  hip       Social History   Living situation: With  Yao, in [place], [place]. One level living     Family system:  Yao of 60+ years, 6 adult children all of whom live local     Self-identified support system: As above     Employment/education: Raised her children     Activities/interests: ND    Use of community resources: [facility] caregivers provide support in the morning and evening for a few hours     Jain affiliation: Yazidism     Involvement in dino community: [facility] Gn[employer]     Impact of illness on patient: Pt has been living with IBM for some time. She has notable decline physically. Now with debilitating dysphagia and likely at the end of her life     Family History   I have reviewed this patient's family history and updated it with pertinent information if needed.   History reviewed. No pertinent family history.    Allergies   No Known Allergies    Medications    Current Facility-Administered Medications Ordered in Epic   Medication Dose Route Frequency Last Rate Last Dose     acetaminophen (TYLENOL) tablet 650 mg  650 mg Oral Q4H PRN         albuterol neb solution 2.5 mg  3 mL Nebulization Q2H PRN         ALPRAZolam (XANAX) tablet 0.25 mg  0.25 mg Oral TID   0.25 mg at 08/22/18 0823     ampicillin-sulbactam (UNASYN) 3 g vial to attach to  mL bag  3 g Intravenous Q6H 100 mL/hr at 08/23/18 0044 3 g at 08/23/18 1313     bisacodyl (DULCOLAX) Suppository 10 mg  10 mg Rectal Daily PRN         Calcium carb-Vitamin D 500 mg [employer] IRA-200 units (OSCAL with D;Oyster Shell Calcium) per tablet 1 tablet  1 tablet Oral BID w/meals   1 tablet at 08/22/18 0823     cholecalciferol (vitamin D3) tablet 2,000 Units  2,000 Units Oral Daily   2,000 Units at 08/22/18 0823     cimetidine (TAGAMET) tablet 400 mg  400 mg Oral At Bedtime   400 mg at 08/21/18 2027     dextrose 5% and 0.45% NaCl + KCl 20 mEq/L infusion   Intravenous Continuous   Stopped at 08/23/18 0048     folic acid (FOLVITE) tablet 1 mg  1 mg Oral TID   1 mg at 08/22/18 0823     ipratropium - albuterol 0.5 mg/2.5 mg/3 mL (DUONEB) neb solution 3 mL  3 mL Nebulization 4x Daily   3 mL at 08/23/18 1103     lidocaine (LMX4) cream   Topical Q1H PRN         lidocaine 1 % 1 mL  1 mL Other Q1H PRN         lisinopril (PRINIVIL/Zestril) tablet 2.5 mg  2.5 mg Oral Daily   2.5 mg at 08/22/18 0823     magnesium sulfate 2 g in water intermittent infusion  2 g Intravenous Daily PRN         magnesium sulfate 4 g in 100 mL sterile water (premade)  4 g Intravenous Q4H PRN         melatonin tablet 1 mg  1 mg Oral At Bedtime PRN         [START ON 8/24/2018] methotrexate tablet CHEMO 12.5 mg  12.5 mg Oral Weekly         miconazole (MICATIN) 2 % cream   Topical BID         naloxone (NARCAN) injection 0.1-0.4 mg  0.1-0.4 mg Intravenous Q2 Min PRN         ondansetron (ZOFRAN-ODT) ODT tab 4 mg  4 mg Oral Q6H PRN        Or     ondansetron (ZOFRAN)  injection 4 mg  4 mg Intravenous Q6H PRN         oxyCODONE IR (ROXICODONE) tablet 5 mg  5 mg Oral Q4H PRN         polyethylene glycol (MIRALAX/GLYCOLAX) Packet 17 g  17 g Oral Daily PRN         potassium chloride (KLOR-CON) Packet 20-40 mEq  20-40 mEq Oral or Feeding Tube Q2H PRN         potassium chloride 10 mEq in 100 mL intermittent infusion with 10 mg lidocaine  10 mEq Intravenous Q1H PRN         potassium chloride 10 mEq in 100 mL sterile water intermittent infusion (premix)  10 mEq Intravenous Q1H PRN         potassium chloride 20 mEq in 50 mL intermittent infusion  20 mEq Intravenous Q1H PRN         potassium chloride SA (K-DUR/KLOR-CON M) CR tablet 20-40 mEq  20-40 mEq Oral Q2H PRN         primidone (MYSOLINE) half-tab 25 mg  25 mg Oral Daily with supper   25 mg at 08/21/18 2027     primidone (MYSOLINE) tablet 50 mg  50 mg Oral QAM   50 mg at 08/22/18 0823     primidone (MYSOLINE) tablet 50 mg  50 mg Oral Daily with lunch         senna-docusate (SENOKOT-S;PERICOLACE) 8.6-50 MG per tablet 1 tablet  1 tablet Oral BID   1 tablet at 08/22/18 0823    Or     senna-docusate (SENOKOT-S;PERICOLACE) 8.6-50 MG per tablet 2 tablet  2 tablet Oral BID         sodium chloride (PF) 0.9% PF flush 3 mL  3 mL Intracatheter Q1H PRN         sodium chloride (PF) 0.9% PF flush 3 mL  3 mL Intracatheter Q8H         No current Epic-ordered outpatient prescriptions on file.       Review of Systems   The comprehensive review of systems is not completed at this time     Physical Exam   Temp: 98.8  F (37.1  C) Temp src: Oral BP: 119/62 Pulse: 87   Resp: 18 SpO2: 93 % O2 Device: None (Room air)    Vitals:    08/21/18 1451 08/22/18 0700 08/23/18 0611   Weight: 61.2 kg (135 lb) 70.1 kg (154 lb 8.7 oz) 69.9 kg (154 lb 1.6 oz)     CONSTITUTIONAL: Chronically ill elderly woman seen sitting up in bed in NAD, A&O. Calm and cooperative. Appropriately tearful throughout the discussion. Very [place]. Family present   HEENT: NCAT  RESPIRATORY: NL  respiratory effort on RA  NEUROLOGIC: BUE tremor   PSYCH: Affect congruent, appropriately tearful     Data   No results found for this or any previous visit (from the past 24 hour(s)).

## 2018-08-23 NOTE — PLAN OF CARE
Problem: Patient Care Overview  Goal: Plan of Care/Patient Progress Review  SLP: Attempted to see patient but was having a meeting with palliative care.

## 2018-08-23 NOTE — PLAN OF CARE
Problem: Patient Care Overview  Goal: Plan of Care/Patient Progress Review  Outcome: No Change  Disoriented to time. [place]. Assist of 2 w lift. Turn repo q 2 hours. No c/o pain. Pt has baseline tremors. VSS on ra. Lung sounds have crackles. Non productive cough. No po meds given due to aspiration risk. Palliative consult today.

## 2018-08-23 NOTE — PROGRESS NOTES
SPIRITUAL HEALTH SERVICES Progress Note  [facility] 66    SH, palliative team consult/referral. The patient and spouse talked about meeting on the dance floor and that the patient's  plays the piano.  observed the patient has difficulty hearing and her  is gentle in directing the conversation back to the patient. The patient talked with  Affection about her  who has been doing cooking, cleaning etc.  The patient and spouse talked about over 60 years of marriage and raising six children. The patient is a long time member of [facility], declined offer to contact the [facility] but wants [facility] to bring communion. The patient talked about the importance of Taoist dino and reflected with the  on the 23rd Psalm. The patient's spouse talked about the possibility of hospice and that one of their children was coming in today for further conversation, visits by other children during current hospitalization.      provided care in presence/listening, reflected on the 23rd [place] and offered a singing blessing.      Coping with spouse support/support from children and appreciates Taoist dino practices.     SH will follow as length of stay.       Brayan Hernandez  Chaplain Resident

## 2018-08-23 NOTE — PROGRESS NOTES
Gastroenterology Chart Check    Spoke with Dr. Roman who reports that patient likely going to hospice care.  Will re-call us with questions.      Lalitha Mcghee PA-C  [facility]

## 2018-08-24 ENCOUNTER — APPOINTMENT (OUTPATIENT)
Dept: SPEECH THERAPY | Facility: CLINIC | Age: 83
DRG: 179 | End: 2018-08-24
Payer: MEDICARE

## 2018-08-24 PROCEDURE — 99356 ZZC PROLONGED SERV,INPATIENT,1ST HR: CPT | Performed by: NURSE PRACTITIONER

## 2018-08-24 PROCEDURE — 25000128 H RX IP 250 OP 636: Performed by: INTERNAL MEDICINE

## 2018-08-24 PROCEDURE — 99233 SBSQ HOSP IP/OBS HIGH 50: CPT | Performed by: INTERNAL MEDICINE

## 2018-08-24 PROCEDURE — 99233 SBSQ HOSP IP/OBS HIGH 50: CPT | Performed by: NURSE PRACTITIONER

## 2018-08-24 PROCEDURE — 25800025 ZZH RX 258: Performed by: INTERNAL MEDICINE

## 2018-08-24 PROCEDURE — A9270 NON-COVERED ITEM OR SERVICE: HCPCS | Mod: GY | Performed by: INTERNAL MEDICINE

## 2018-08-24 PROCEDURE — 40000225 ZZH STATISTIC SLP WARD VISIT: Performed by: SPEECH-LANGUAGE PATHOLOGIST

## 2018-08-24 PROCEDURE — 99357 ZZC PROLONGED SERV,INPATIENT,EA ADD 1/2: CPT | Performed by: NURSE PRACTITIONER

## 2018-08-24 PROCEDURE — 12000000 ZZH R&B MED SURG/OB

## 2018-08-24 PROCEDURE — 92526 ORAL FUNCTION THERAPY: CPT | Mod: GN | Performed by: SPEECH-LANGUAGE PATHOLOGIST

## 2018-08-24 PROCEDURE — 25000132 ZZH RX MED GY IP 250 OP 250 PS 637: Mod: GY | Performed by: INTERNAL MEDICINE

## 2018-08-24 PROCEDURE — 25000131 ZZH RX MED GY IP 250 OP 636 PS 637: Mod: GY | Performed by: INTERNAL MEDICINE

## 2018-08-24 RX ORDER — ALPRAZOLAM 0.25 MG
0.25 TABLET ORAL 3 TIMES DAILY
Status: DISCONTINUED | OUTPATIENT
Start: 2018-08-24 | End: 2018-08-25 | Stop reason: HOSPADM

## 2018-08-24 RX ORDER — ALPRAZOLAM 0.25 MG
0.25 TABLET ORAL 3 TIMES DAILY PRN
Status: DISCONTINUED | OUTPATIENT
Start: 2018-08-24 | End: 2018-08-24

## 2018-08-24 RX ADMIN — METHOTREXATE 12.5 MG: 2.5 TABLET ORAL at 20:51

## 2018-08-24 RX ADMIN — AMPICILLIN SODIUM AND SULBACTAM SODIUM 3 G: 2; 1 INJECTION, POWDER, FOR SOLUTION INTRAMUSCULAR; INTRAVENOUS at 06:41

## 2018-08-24 RX ADMIN — AMPICILLIN SODIUM AND SULBACTAM SODIUM 3 G: 2; 1 INJECTION, POWDER, FOR SOLUTION INTRAMUSCULAR; INTRAVENOUS at 00:10

## 2018-08-24 RX ADMIN — FOLIC ACID 1 MG: 1 TABLET ORAL at 22:16

## 2018-08-24 RX ADMIN — OYSTER SHELL CALCIUM WITH VITAMIN D 1 TABLET: 500; 200 TABLET, FILM COATED ORAL at 19:28

## 2018-08-24 RX ADMIN — VITAMIN D, TAB 1000IU (100/BT) 2000 UNITS: 25 TAB at 08:44

## 2018-08-24 RX ADMIN — FOLIC ACID 1 MG: 1 TABLET ORAL at 19:30

## 2018-08-24 RX ADMIN — CIMETIDINE 400 MG: 200 TABLET, FILM COATED ORAL at 22:16

## 2018-08-24 RX ADMIN — ALPRAZOLAM 0.25 MG: 0.25 TABLET ORAL at 19:27

## 2018-08-24 RX ADMIN — AMPICILLIN SODIUM AND SULBACTAM SODIUM 3 G: 2; 1 INJECTION, POWDER, FOR SOLUTION INTRAMUSCULAR; INTRAVENOUS at 14:19

## 2018-08-24 RX ADMIN — MICONAZOLE NITRATE: 20 CREAM TOPICAL at 10:20

## 2018-08-24 RX ADMIN — POTASSIUM CHLORIDE, DEXTROSE MONOHYDRATE AND SODIUM CHLORIDE: 150; 5; 450 INJECTION, SOLUTION INTRAVENOUS at 17:01

## 2018-08-24 RX ADMIN — LISINOPRIL 2.5 MG: 2.5 TABLET ORAL at 08:45

## 2018-08-24 RX ADMIN — AMPICILLIN SODIUM AND SULBACTAM SODIUM 3 G: 2; 1 INJECTION, POWDER, FOR SOLUTION INTRAMUSCULAR; INTRAVENOUS at 19:17

## 2018-08-24 RX ADMIN — ALPRAZOLAM 0.25 MG: 0.25 TABLET ORAL at 22:16

## 2018-08-24 RX ADMIN — Medication 25 MG: at 19:30

## 2018-08-24 ASSESSMENT — ACTIVITIES OF DAILY LIVING (ADL)
ADLS_ACUITY_SCORE: 29
ADLS_ACUITY_SCORE: 33
ADLS_ACUITY_SCORE: 29
ADLS_ACUITY_SCORE: 33
ADLS_ACUITY_SCORE: 33
ADLS_ACUITY_SCORE: 29

## 2018-08-24 NOTE — PLAN OF CARE
"Problem: Patient Care Overview  Goal: Plan of Care/Patient Progress Review  Discharge Planner SLP   Patient plan for discharge:  reported home tomorrow to \"see how it goes\" but wanted pt to return to hospital if pneumonia returned  Current status: SLP: Pt alert with  present. Pt reported being hungry and  expressed frustration that pt has not been allowed to eat. Offered several PO trials. Pt agreed to trial popsicle with continued severe dysphagia characterized by significant overt s/sx of aspiration on every swallow. Extensive education provided to pt/ that SLP cannot recommend that pt eat or drink by mouth and diet order deferred to MD, however,  firm that pt does not want a feeding tube. Discussed upcoming care conference to further discuss, however,  would like pt to eat/drink now. Again, education provided on high risk of aspiration, however, it is pt's right to refuse recommendations knowing the risks.  expressed concern that pt would not be hospitalized if she was on hospice care. Additional questions were deferred to MD.   Barriers to return to prior living situation: Severe dysphagia  Recommendations for discharge: Per pt/  Rationale for recommendations: ST will follow additional session if indicated for further education       Entered by: Angelina Peace 08/24/2018 11:54 AM       Addendum: Discussed outcome of family meeting with CARA Ross CNP with Palliative Care. Family would like pt to continue to eat/drink for comfort accepting the risk of aspiration. Per discussion, family is aware of aspiration with all PO. Full liquid, thin liquid diet would be the best option based on review of pt's Video Swallow Study. Thicker consistencies and foods increase pt's risk of pharyngeal residue and aspiration. No further SLP needs at this time Will sign off.   "

## 2018-08-24 NOTE — PROGRESS NOTES
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 Garcia from [facility], chronic dysphasia has been seems to be giving her mechanical soft diet at home, spinal stenosis, chronic tremors and vertigo, wheelchair-bound as a baseline who was admitted from the emergency room with possible aspiration pneumonia and lethargy, found to have aspiration PNA. She was evaluated by SLP who notes her severe dysphagia.  She was kept NPO it was so severe.  She was seen by palliative care and she and her  have declined hospice/comfort care.  They have decided to eat expressing understanding of the risks.    Aspiration pneumonia, left lower lobe related to progressive chronic dysphagia:  Patient has been having issues with dysphagia for long time and coughs with food intake even at home. Patient likely had an aspiration event with pneumonia.  Speech evaluated and recommend NPO.     -  She and her family met with palliative care today.  They have elected to not pursue hospice/comfort care.  They request she eat understanding the risks of further aspiration which could lead to death.  The patient has decided to change to DNR/DNI.  She would like to continue her home meds and other treatments such as antibiotics.  She would like discharge this weekend if possible.  -  Palliative spoke with speech which recommended full liquid with thins as the diet given the patients wishes.  I do not think she should have any solids now or in the future given the high risk of severe choking.  Meds will be crushed as she has been doing at home.  - Unasyn IV tonight and then will change to oral augmentin BID  in the AM      Inclusion body myositis  Vertigo   Spinal stenosis, lumbar  Essential tremor  Patient is following closely with Dr. Garcia from [facility], [facility] and was last seen in December 2017.  Currently she is compliant with her medication and seems to be doing is stable from her myositis point of view  - Oral home meds to continue given above intake plan/patient wishes.      Essential hypertension  - Lisinopril to resume    Chronic anxiety:  -  Continue alprazolam    DVT Prophylaxis: Pneumatic Compression Devices  Code Status: DNR/DNI    Disposition Plan   Expected discharge tomorrow afternoon if she continues improving.     Entered: Stephon Mathias 08/24/2018, 6:29 PM       Interval History   Assumed care, history reviewed.  The patient and her  feel she has improved.  They acknowledge aspiration wish but wish to pursue at least a full liquid diet which has been ordered.  She denied pain or current SOB.    -Data reviewed today: I reviewed all new labs and imaging reports over the last 24 hours. I personally reviewed no images or EKG's today.    Physical Exam   Temp: 98.5  F (36.9  C) Temp src: Oral BP: 141/74 Pulse: 96   Resp: 18 SpO2: 92 % O2 Device: None (Room air)    Vitals:    08/21/18 1451 08/22/18 0700 08/23/18 0611   Weight: 61.2 kg (135 lb) 70.1 kg (154 lb 8.7 oz) 69.9 kg (154 lb 1.6 oz)     Vital Signs with Ranges  Temp:  [98.2  F (36.8  C)-98.5  F (36.9  C)] 98.5  F (36.9  C)  Pulse:  [] 96  Resp:  [18] 18  BP: (130-141)/(74-81) 141/74  SpO2:  [91 %-92 %] 92 %  I/O last 3 completed shifts:  In: 265 [I.V.:265]  Out: -     GEN:  Alert, oriented x 3, appears weak/ill but comfortable, no overt distress.  HEENT:  No scleral icterus, no nasal discharge, mouth moist.  CV:  Regular rate and rhythm, distant.  No rub.  LUNGS:  Slightly coarse but no wheezes/retractions.  Symmetric chest rise on inhalation noted.  ABD:  Active bowel sounds, soft, non-tender/non-distended.  No  rebound/guarding/rigidity.  EXT:  Trace edema.  No cyanosis.  SKIN:  Dry to touch, no exanthems noted in the visualized areas.    Medications     dextrose 5% and 0.45% NaCl + KCl 20 mEq/L 50 mL/hr at 08/24/18 1701       ALPRAZolam  0.25 mg Oral TID     [START ON 8/25/2018] amoxicillin-clavulanate  1 tablet Oral Q12H ZORAIDA     ampicillin-sulbactam (UNASYN) IV  3 g Intravenous Q6H     calcium carbonate 500 mg-vitamin D 200 units  1 tablet Oral BID w/meals     cholecalciferol  2,000 Units Oral Daily     cimetidine  400 mg Oral At Bedtime     folic acid  1 mg Oral TID     lisinopril  2.5 mg Oral Daily     methotrexate tablet CHEMO 12.5 mg  12.5 mg Oral Weekly     miconazole   Topical BID     primidone  25 mg Oral Daily with supper     primidone  50 mg Oral QAM     primidone  50 mg Oral Daily with lunch     senna-docusate  1 tablet Oral BID    Or     senna-docusate  2 tablet Oral BID     sodium chloride (PF)  3 mL Intracatheter Q8H     Data       Recent Labs  Lab 08/22/18  0846 08/21/18  1538   WBC 6.8 9.1   HGB 12.2 12.3   HCT 37.5 37.1   MCV 92 92    213       Recent Labs  Lab 08/21/18  1638 08/21/18  1557 08/21/18  1526   CULT No growth after 3 days No growth after 3 days No growth       Recent Labs  Lab 08/22/18  0846 08/21/18  1538    134   POTASSIUM 3.9 4.4   CHLORIDE 103 98   CO2 26 29   ANIONGAP 8 7   * 119*   BUN 10 17   CR 0.39* 0.46*   GFRESTIMATED >90 >90   GFRESTBLACK >90 >90   BRANDON 7.6* 9.1   MAG 1.6 1.9   PROTTOTAL 6.0* 6.7*   ALBUMIN 2.2* 2.6*   BILITOTAL 0.3 0.4   ALKPHOS 133 148   AST 33 41   ALT 38 47       No results found for this or any previous visit (from the past 24 hour(s)).

## 2018-08-24 NOTE — PROGRESS NOTES
[facility]: Consult cancelled as family is not quite ready for hospice at this time. Thank you.Aline Greco RN, BSN   Hospice Admission nurse  [phone]

## 2018-08-24 NOTE — PLAN OF CARE
Problem: Patient Care Overview  Goal: Plan of Care/Patient Progress Review  Outcome: No Change  Code Status changed to DNR/DNI. Diet changed to Full Liquid, tolerating well. Per physician, medications that patient refused in AM are to be given at this time. A&O to self and place, disoriented to situation and time. IVF at 50 ml/hr. Turn q2h with ceiling lift. Incontinent of B and B. Purewick discontinued. Denies pain Mepilex to R buttocks. Plan is to discharge tomorrow afternoon. Will continue to monitor.

## 2018-08-24 NOTE — PLAN OF CARE
Problem: Patient Care Overview  Goal: Plan of Care/Patient Progress Review  Outcome: Improving  Disoriented to situation at times. Forgetful. VSS on ra. Up w 2 and lift and bed bound overnight. Bm and urinating overnight. Placed purewick due to saturating briefs frequently. No c/o pain. Mepilex on r buttocks. Pt refused scheduled xanax due to fear of aspiration. Family is meeting w palliative again today. Nursing will continue to follow.

## 2018-08-24 NOTE — PROGRESS NOTES
[facility]    Palliative Care Progress Note    Nelia Solano  MRN# [id number]  Date of Admission:  8/21/2018  Date of Service (when I saw the patient): 08/24/2018    Assessment & Plan   Nelia Solano is a 89 year old female with PMH significant for inclusion body myositis, chronic dysphagia, chronic tremors, HTN, HLD, and spinal stenosis who presents with fever and lethargy, found to have aspiration PNA. She was evaluated by SLP who notes her severe dysphagia, now NPO. We are consulted for goals of care and pt and family support.     Symptoms/Recommendations  -Goals of care remain restorative. Pt and family hope to return to the hospital when another illness presents, and thus hospice would not be appropriate at this time. They are aware that hospice is available at anytime in the future, should their wishes change   -Pt is opposed to a feeding tube now and into the future   -We will thus allow her to eat and drink by mouth the best she can. Risk of aspiration, breathing failure, and death discussed in detail with pt and family yesterday and today  -In discussion with SLP, safest PO diet will be full liquid diet, soupy consistency. Pt and family educated on nutrition options   -Pt has elected DNR/DNI   -We completed a POLST form to designate her wish for DNR/DNI and treat reversible illness. I have sent a copy to HIM to be scanned into our system   -Prepare for likely discharge home with family assist tomorrow afternoon     Support/Coping  -Well supported by  Yao of 60+ years. Together, they raised 6 children, all of whom live local. Son Mario and daughter Brenda present again today   -Pt is Mormon. Appreciate support from Palliative Chaplain resident, Demarcus Hernandez     Decisional Support, Goals of Care, Counseling & Coordination  Decisional Capacity Intact?  -Not independently. Reasonable to involve her in the decision making, but benefits from having the support of her family present for  complex medical decision making   Health Care Directive on File?  -No, but  Yao reports having one at home. Asked him to bring it in to be scanned into our system. A POLST was completed today to be scanned into our system   Code Status/Resuscitation Preferences?  -Changed to DNR/DNI subsequent to today's visit     Discussion  Visited with Nelia, along with  Yao, son Mario, and daughter Brenda this afternoon. We reviewed the result of today's SLP evaluation and ongoing high risk of aspiration.     We acknowledge that we do not actually know how long her dysphagia has been this severe. It is possible she has been living like this for a while, and just becoming more symptomatic now. We acknowledge that ongoing aspiration will continue to be problematic moving forward; it remains uncertain if a large aspiration could happen sooner than later, or if it will continue to be a steady decline. Needless to say, I prepared family for the wide range of possibilities. We detailed at length the risk of aspiration, future PNA, breathing failure, and death.    Pt is opposed to having a feeding tube now and into the future. We thus decide to do the best we can with PO intake by mouth, recognizing that the risk of aspiration and respiratory compromise will always be there, and she could die from it at any point.    They ultimately do not feel comfortable with closing the door to future treatment for PNA and returning to the hospital. They understand that they do not qualify for hospice, but know that it can be apart of their care in the future, should they change their minds.    We spent some time reviewing code status again today. I educated regarding the pros and cons of attempting cardiac resuscitation. I educated regarding the pros and cons of intubation and mechanical ventilation. Discussed probability of survival as well as quality of life implications. Recommended DNR/DNI, and they unanimously agreed.    We  then completed a POLST form to reflect DNR/DNI election, along with addressing her disinterest in a feeding tube. We elect all forms of abx therapy and treat reversible illness, when possible.     I then reviewed her case with SLP and we selected a full liquid diet, soupy consistency. We discussed nutrition options, including boost/ensure options.    Extensive emotional support provided to pt and family.      Case discussed with bedside RN, unit D[facility]UV Cotter, hospice liaison Aline, and Dr. Mathias.     Thank you for involving us in the care of this patient and family. We will continue to follow. Please do not hesitate to contact me with questions or concerns or the on-call provider for our team if evening or weekend.    Lea MARROQUIN, CNP  Palliative Medicine   Pager [phone]    Attestation:  Total time on the floor involved in the patient's care: 120 minutes (direct face to face counseling from 0045-7730)  Total time spent in counseling/care coordination: >50%    Interval History   No acute events overnight. Remains NPO. Remains HD stable. Pt was afraid to take pills this AM out of fear of aspiration. Pt worked with SLP with [facility] aspiration on popsicle.      Medications   Current Facility-Administered Medications Ordered in Epic   Medication Dose Route Frequency Last Rate Last Dose     acetaminophen (TYLENOL) tablet 650 mg  650 mg Oral Q4H PRN         albuterol neb solution 2.5 mg  3 mL Nebulization Q2H PRN         ALPRAZolam (XANAX) tablet 0.25 mg  0.25 mg Oral TID   0.25 mg at 08/23/18 1751     ampicillin-sulbactam (UNASYN) 3 g vial to attach to  mL bag  3 g Intravenous Q6H 100 mL/hr at 08/23/18 0044 3 g at 08/24/18 1419     bisacodyl (DULCOLAX) Suppository 10 mg  10 mg Rectal Daily PRN         Calcium carb-Vitamin D 500 mg [place]-200 units (OSCAL with D;[place] Calcium) per tablet 1 tablet  1 tablet Oral BID w/meals   1 tablet at 08/22/18 0823     cholecalciferol (vitamin D3) tablet 2,000 Units   2,000 Units Oral Daily   2,000 Units at 08/22/18 0823     cimetidine (TAGAMET) tablet 400 mg  400 mg Oral At Bedtime   400 mg at 08/21/18 2027     dextrose 5% and 0.45% NaCl + KCl 20 mEq/L infusion   Intravenous Continuous   Stopped at 08/24/18 0018     folic acid (FOLVITE) tablet 1 mg  1 mg Oral TID   1 mg at 08/22/18 0823     lidocaine (LMX4) cream   Topical Q1H PRN         lidocaine 1 % 1 mL  1 mL Other Q1H PRN         lisinopril (PRINIVIL/Zestril) tablet 2.5 mg  2.5 mg Oral Daily   2.5 mg at 08/22/18 0823     magnesium sulfate 2 g in water intermittent infusion  2 g Intravenous Daily PRN         magnesium sulfate 4 g in 100 mL sterile water (premade)  4 g Intravenous Q4H PRN         melatonin tablet 1 mg  1 mg Oral At Bedtime PRN         methotrexate tablet CHEMO 12.5 mg  12.5 mg Oral Weekly         miconazole (MICATIN) 2 % cream   Topical BID         naloxone (NARCAN) injection 0.1-0.4 mg  0.1-0.4 mg Intravenous Q2 Min PRN         ondansetron (ZOFRAN-ODT) ODT tab 4 mg  4 mg Oral Q6H PRN        Or     ondansetron (ZOFRAN) injection 4 mg  4 mg Intravenous Q6H PRN         oxyCODONE IR (ROXICODONE) tablet 5 mg  5 mg Oral Q4H PRN         polyethylene glycol (MIRALAX/GLYCOLAX) Packet 17 g  17 g Oral Daily PRN         potassium chloride (KLOR-CON) Packet 20-40 mEq  20-40 mEq Oral or Feeding Tube Q2H PRN         potassium chloride 10 mEq in 100 mL intermittent infusion with 10 mg lidocaine  10 mEq Intravenous Q1H PRN         potassium chloride 10 mEq in 100 mL sterile water intermittent infusion (premix)  10 mEq Intravenous Q1H PRN         potassium chloride 20 mEq in 50 mL intermittent infusion  20 mEq Intravenous Q1H PRN         potassium chloride SA (K-DUR/KLOR-CON M) CR tablet 20-40 mEq  20-40 mEq Oral Q2H PRN         primidone (MYSOLINE) half-tab 25 mg  25 mg Oral Daily with supper   25 mg at 08/23/18 1751     primidone (MYSOLINE) tablet 50 mg  50 mg Oral QAM   50 mg at 08/22/18 0823     primidone (MYSOLINE)  tablet 50 mg  50 mg Oral Daily with lunch         senna-docusate (SENOKOT-S;PERICOLACE) 8.6-50 MG per tablet 1 tablet  1 tablet Oral BID   1 tablet at 08/22/18 0823    Or     senna-docusate (SENOKOT-S;PERICOLACE) 8.6-50 MG per tablet 2 tablet  2 tablet Oral BID         sodium chloride (PF) 0.9% PF flush 3 mL  3 mL Intracatheter Q1H PRN         sodium chloride (PF) 0.9% PF flush 3 mL  3 mL Intracatheter Q8H         No current [facility]-ordered outpatient prescriptions on file.       Physical Exam   Temp: 98.5  F (36.9  C) Temp src: Oral BP: 138/74 Pulse: 90   Resp: 18 SpO2: 91 % O2 Device: None (Room air)    Vitals:    08/21/18 1451 08/22/18 0700 08/23/18 0611   Weight: 61.2 kg (135 lb) 70.1 kg (154 lb 8.7 oz) 69.9 kg (154 lb 1.6 oz)     CONSTITUTIONAL: Chronically ill elderly woman seen sitting up in bed in [facility], A&O. Calm and cooperative. Appropriately tearful throughout the discussion. Voice occasionally wet and raspy, cleared effectively. Very [place]. Family present   HEENT: NCAT  RESPIRATORY: NL respiratory effort on RA. Occasional dry cough and clearing of the throat   NEUROLOGIC: BUE tremor   PSYCH: Affect congruent, appropriately tearful at times     Data   No results found for this or any previous visit (from the past 24 hour(s)).

## 2018-08-24 NOTE — PLAN OF CARE
Problem: Patient Care Overview  Goal: Plan of Care/Patient Progress Review  Outcome: Improving  Pt is A&Ox3, disoriented to situation. Forgetful. [place]. VSS ex occasionally elevated HR. On RA with LS dm/clear. Tele NSR. Denies pain or SOB. NPO ex tremor meds crushed in applesauce.Total care with T/RQ2H and lift assist.  Incont of bladder. Cont of hard form stool in commode. Blanchable redness to right buttocks, mepilex in place. R PIV infusing D5 1/2NS with 20 K at 50 ml/hr. On IV abx. Family in room most of the shift. Palliative meeting tomorrow 1300 for final decision. Will continue to monitor.

## 2018-08-25 VITALS
HEART RATE: 82 BPM | BODY MASS INDEX: 26.9 KG/M2 | OXYGEN SATURATION: 91 % | DIASTOLIC BLOOD PRESSURE: 75 MMHG | HEIGHT: 62 IN | TEMPERATURE: 98.4 F | SYSTOLIC BLOOD PRESSURE: 132 MMHG | RESPIRATION RATE: 19 BRPM | WEIGHT: 146.16 LBS

## 2018-08-25 PROCEDURE — 25000132 ZZH RX MED GY IP 250 OP 250 PS 637: Mod: GY | Performed by: INTERNAL MEDICINE

## 2018-08-25 PROCEDURE — 99239 HOSP IP/OBS DSCHRG MGMT >30: CPT | Performed by: INTERNAL MEDICINE

## 2018-08-25 PROCEDURE — A9270 NON-COVERED ITEM OR SERVICE: HCPCS | Mod: GY | Performed by: INTERNAL MEDICINE

## 2018-08-25 RX ORDER — ACETAMINOPHEN 325 MG/1
325-650 TABLET ORAL EVERY 6 HOURS PRN
Qty: 100 TABLET | Refills: 0 | COMMUNITY
Start: 2018-08-25

## 2018-08-25 RX ADMIN — OYSTER SHELL CALCIUM WITH VITAMIN D 1 TABLET: 500; 200 TABLET, FILM COATED ORAL at 09:58

## 2018-08-25 RX ADMIN — PRIMIDONE 50 MG: 50 TABLET ORAL at 09:59

## 2018-08-25 RX ADMIN — AMOXICILLIN AND CLAVULANATE POTASSIUM 1 TABLET: 875; 125 TABLET, FILM COATED ORAL at 09:57

## 2018-08-25 RX ADMIN — VITAMIN D, TAB 1000IU (100/BT) 2000 UNITS: 25 TAB at 09:58

## 2018-08-25 RX ADMIN — FOLIC ACID 1 MG: 1 TABLET ORAL at 09:58

## 2018-08-25 RX ADMIN — LISINOPRIL 2.5 MG: 2.5 TABLET ORAL at 09:58

## 2018-08-25 RX ADMIN — MICONAZOLE NITRATE: 20 CREAM TOPICAL at 10:02

## 2018-08-25 RX ADMIN — SENNOSIDES AND DOCUSATE SODIUM 1 TABLET: 8.6; 5 TABLET ORAL at 10:00

## 2018-08-25 RX ADMIN — ALPRAZOLAM 0.25 MG: 0.25 TABLET ORAL at 09:57

## 2018-08-25 ASSESSMENT — ACTIVITIES OF DAILY LIVING (ADL)
ADLS_ACUITY_SCORE: 29

## 2018-08-25 NOTE — PLAN OF CARE
Problem: Patient Care Overview  Goal: Plan of Care/Patient Progress Review  Outcome: No Change  VSS on RA, lift/total care, disoriented to time. Confused NOC, refused some cares and a dose of antibiotic. Tolerating full liquid. Incontinent of bowel and bladder. Purwick in place. BM on evening. IVF infusing. R buttock red, blanching. Foam dressing changed. Denies pain. DC planned for tomorrow. Continue to monitor.

## 2018-08-25 NOTE — PROGRESS NOTES
"SPIRITUAL HEALTH SERVICES Progress Note  [facility] 66     met with pt's  and son outside pt's room. Pt is dc today and pt's family shared that pt's condition is terminal. Pt's  and son shared their strong Jain (Scientologist) dino as a major source of support. Pt's  was reflective and named the family's sense of just \"making the best of every day\".     Pt's  thanked  for visiting and expressed gratitude for previous visit from chaplain Hernandez, naming it as \"very helpful\"    Alberto Guardado M.Div.  Chaplain Resident  [phone] Pager  "

## 2018-08-25 NOTE — PROGRESS NOTES
Discharge    Patient discharged to home via vehicle with  and son.  Care plan note: Patient discharged to home with her  and son. VSS on RA. IV discontinued. Tele discontinued.  will contact patient's PCP to schedule a follow up appointment.  and son verbalized understanding of all discharge instructions.    Listed belongings gathered and returned to patient. Yes  Care Plan and Patient education resolved: Yes  Prescriptions if needed, hard copies sent with patient  Yes  Home and hospital acquired medications returned to patient: Yes  Medication Bin checked and emptied on discharge Yes  Follow up appointment made for patient:  will call to schedule a follow up appointment with patient's PCP.

## 2018-08-25 NOTE — CONSULTS
Care Transition Initial Assessment - D[facility]UV  Reason For Consult: discharge planning  Met with: Pt, spouse and son Mario.     Principal Problem:    Aspiration pneumonitis (H)  Active Problems:    Inclusion body myositis    Spinal stenosis, lumbar    Tremor    Vertigo    Hypertension    Essential tremor    Physical deconditioning    Pneumonia of left lower lobe due to infectious organism (H)    Weakness    Aspiration pneumonia (H)       DATA  Lives With: spouse  Living Arrangements: house  Description of Support System: Involved, Supportive  Who is your support system?: , Children  Support Assessment: Adequate family and caregiver support.   Identified issues/concerns regarding health management: SW reviewed chart. Spoke with Dr Mathias. Plan is for pt to DC home today with family. SW met with pt, spouse and son Mario. Spouse is the primary caregiver. Mario lives close and helps as needed. Spouse reports other children are also supportive. Spouse pays privately for [facility] to come in every morning for an hour to get pt up and on the commode and ready for the day. They come back in the evening at 10:30pm to get her back in bed. They also have housekeeping and laundry services. They have [employer] through [employer]. Discussed adding skilled RN and SLP services due to hospitalization and spouse is agreeable. He appears interested in any additional help that is available. SW offered choice of home care agency. They would like to use [facility]. Referral emailed.      Resources List: Home Care     Quality Of Family Relationships: involved, supportive     ASSESSMENT  Cognitive Status: Forgetful per nursing but participated in conversation. Family appears involved and supportive.   Concerns to be addressed: None further.     PLAN  Financial costs for the patient includes: None.  Patient given options and choices for discharge: Yes.  Patient/family is agreeable to the plan? YES  Patient Goals and Preferences: DC  home.  Patient anticipates discharging to: home.    DARLING Paris, [facility]  [id number]

## 2018-08-25 NOTE — PROGRESS NOTES
Patient feels well, no new complaints.  Continues with dysphagia but as prior documented declined hospice and feeding tubes.  We settled on a full liquid diet with crushed meds (she prefers crushed over liquid meds in general).  She and her  understand there is no safe oral diet for her.  She did decide this admission to be DNR/DNI.  She will discharge home later today with her  and home services.

## 2018-08-25 NOTE — DISCHARGE INSTRUCTIONS
A referral has been made to [facility]. A representative will call to schedule a visit.  [phone]

## 2018-08-26 PROCEDURE — G0180 MD CERTIFICATION HHA PATIENT: HCPCS | Performed by: FAMILY MEDICINE

## 2018-08-27 LAB
BACTERIA SPEC CULT: NO GROWTH
BACTERIA SPEC CULT: NO GROWTH
Lab: NORMAL
Lab: NORMAL
SPECIMEN SOURCE: NORMAL
SPECIMEN SOURCE: NORMAL

## 2018-08-29 ENCOUNTER — TELEPHONE (OUTPATIENT)
Dept: FAMILY MEDICINE | Facility: CLINIC | Age: 83
End: 2018-08-29

## 2018-08-30 ENCOUNTER — MEDICAL CORRESPONDENCE (OUTPATIENT)
Dept: FAMILY MEDICINE | Facility: CLINIC | Age: 83
End: 2018-08-30

## 2018-08-30 ENCOUNTER — TELEPHONE (OUTPATIENT)
Dept: FAMILY MEDICINE | Facility: CLINIC | Age: 83
End: 2018-08-30

## 2018-08-30 NOTE — TELEPHONE ENCOUNTER
Daughter calls asking about process for obtaining hospital bed for use in home.   Patient has Inclusion body myositis and is bed/wheelchair bound. A lift is used in home for transfers. Currently sleeps in regular twin bed.   Was hospitalized last week with aspiration pneumonia and now on liquid diet.   Lives at home with  as primary caregiver. Home care staff comes in for couple hours q am.   Patient sees Dr. Gagandeep Garcia at [facility] - last consult note from him in [facility] is dated 11/20/2017.     Discussed Medicare requires face to face visit with provider and chart notes documenting diagnosis and rationale for hospital bed.   Encouraged Nelli to contact their preferred DME provider to discuss also Patient has used [employer] in past and Nelli will call them and if they want to proceed with this will make appt with us or if seeing neurology in near future they would likely be able to order this as well.   Nelli will call us if any further questions.   Maria Victoria Tena RN

## 2018-08-30 NOTE — TELEPHONE ENCOUNTER
Date of Admission:  8/21/2018  Date of Discharge:  8/25/2018  2:18 PM       Discharge Diagnoses      1.  Aspiration pneumonia  2.  Progressive dysphagia    Daughter calls asking about process for obtaining hospital bed for use in home.   Patient has Inclusion body myositis and is bed/wheelchair bound. A lift is used in home for transfers. Currently sleeps in regular twin bed.   Was hospitalized last week with aspiration pneumonia and now on liquid diet.   Lives at home with  as primary caregiver. Home care staff comes in for couple hours q am.   Patient sees Dr. Gagandeep Garcia at [facility] - last consult note from him in [facility] is dated 11/20/2017.      Discussed Medicare requires face to face visit with provider and chart notes documenting diagnosis and rationale for hospital bed.   Encouraged Nelli to contact their preferred DME provider to discuss also Patient has used [facility] [facility] [employer] in past and Nelli will call them and if they want to proceed with this will make appt with us or if seeing neurology in near future they would likely be able to order this as well.   Nelli will call us if any further questions.   Maria Victoria Tena RN    No further contact required until daughter of patient calls back  Gilda Monsivais MA August 30, 2018 3:10 PM

## 2018-09-04 ENCOUNTER — MEDICAL CORRESPONDENCE (OUTPATIENT)
Dept: FAMILY MEDICINE | Facility: CLINIC | Age: 83
End: 2018-09-04

## 2018-09-06 ENCOUNTER — MEDICAL CORRESPONDENCE (OUTPATIENT)
Dept: FAMILY MEDICINE | Facility: CLINIC | Age: 83
End: 2018-09-06

## 2018-09-07 ENCOUNTER — MEDICAL CORRESPONDENCE (OUTPATIENT)
Dept: FAMILY MEDICINE | Facility: CLINIC | Age: 83
End: 2018-09-07

## 2018-09-08 DIAGNOSIS — B37.2 YEAST DERMATITIS: ICD-10-CM

## 2018-09-10 RX ORDER — NYSTATIN 100000 [USP'U]/G
POWDER TOPICAL
Qty: 60 G | Refills: 0 | Status: SHIPPED | OUTPATIENT
Start: 2018-09-10 | End: 2018-12-21

## 2018-09-12 ENCOUNTER — MEDICAL CORRESPONDENCE (OUTPATIENT)
Dept: FAMILY MEDICINE | Facility: CLINIC | Age: 83
End: 2018-09-12

## 2018-09-14 ENCOUNTER — OFFICE VISIT (OUTPATIENT)
Dept: FAMILY MEDICINE | Facility: CLINIC | Age: 83
End: 2018-09-14

## 2018-09-14 ENCOUNTER — MEDICAL CORRESPONDENCE (OUTPATIENT)
Dept: FAMILY MEDICINE | Facility: CLINIC | Age: 83
End: 2018-09-14

## 2018-09-14 VITALS
RESPIRATION RATE: 16 BRPM | HEART RATE: 103 BPM | SYSTOLIC BLOOD PRESSURE: 126 MMHG | DIASTOLIC BLOOD PRESSURE: 82 MMHG | OXYGEN SATURATION: 93 %

## 2018-09-14 DIAGNOSIS — G72.41 INCLUSION BODY MYOSITIS (H): ICD-10-CM

## 2018-09-14 DIAGNOSIS — R13.10 DYSPHAGIA, UNSPECIFIED TYPE: ICD-10-CM

## 2018-09-14 DIAGNOSIS — R25.1 TREMOR: ICD-10-CM

## 2018-09-14 DIAGNOSIS — J69.0 ASPIRATION PNEUMONITIS (H): ICD-10-CM

## 2018-09-14 DIAGNOSIS — R53.81 PHYSICAL DECONDITIONING: ICD-10-CM

## 2018-09-14 DIAGNOSIS — M48.061 SPINAL STENOSIS OF LUMBAR REGION WITHOUT NEUROGENIC CLAUDICATION: Primary | ICD-10-CM

## 2018-09-14 PROCEDURE — 99214 OFFICE O/P EST MOD 30 MIN: CPT | Performed by: NURSE PRACTITIONER

## 2018-09-14 NOTE — MR AVS SNAPSHOT
After Visit Summary   9/14/2018    Nelia Solano    MRN: [id number]           Patient Information     Date Of Birth          4/27/1928        Visit Information        Provider Department      9/14/2018 11:30 AM Kay Olivera APRN CNP [facility]        Today's Diagnoses     Spinal stenosis of lumbar region without neurogenic claudication    -  1    Inclusion body myositis        Aspiration pneumonitis (H)        Dysphagia, unspecified type        Physical deconditioning        Tremor           Follow-ups after your visit        Follow-up notes from your care team     Return if symptoms worsen or fail to improve.      Who to contact     If you have questions or need follow up information about today's clinic visit or your schedule please contact [facility] directly at [phone].  Normal or non-critical lab and imaging results will be communicated to you by MyChart, letter or phone within 4 business days after the clinic has received the results. If you do not hear from us within 7 days, please contact the clinic through MyChart or phone. If you have a critical or abnormal lab result, we will notify you by phone as soon as possible.  Submit refill requests through [employer] or call your pharmacy and they will forward the refill request to us. Please allow 3 business days for your refill to be completed.          Additional Information About Your Visit        Care EveryWhere ID     This is your Care EveryWhere ID. This could be used by other organizations to access your [place] medical records  [id number]        Your Vitals Were     Pulse Respirations Pulse Oximetry             103 16 93%          Blood Pressure from Last 3 Encounters:   09/14/18 126/82   08/25/18 132/75   07/18/18 114/72    Weight from Last 3 Encounters:   08/25/18 66.3 kg (146 lb 2.6 oz)   01/26/15 62 kg (136 lb 11 oz)   12/24/13 56.7 kg (125 lb)              Today, you had the following     No orders  found for display       Primary Care Provider Office Phone # Fax #    Pavan Kern [phone] [address]  [facility] [address]        Equal Access to Services     JESI CHAND : Hadmario valerio ku velo Soheber, waaxda luqadaha, qaybta kaalmada aderossyda, sánchez cadena laTommarleny butterfield. So [facility] [phone].    ATENCIÓN: Si habla español, tiene a landis disposición servicios gratuitos de asistencia lingüística. Llame al [phone].    We comply with applicable federal civil rights laws and [place] laws. We do not discriminate on the basis of race, color, national origin, age, disability, sex, sexual orientation, or gender identity.            Thank you!     Thank you for choosing [facility]  for your care. Our goal is always to provide you with excellent care. Hearing back from our patients is one way we can continue to improve our services. Please take a few minutes to complete the written survey that you may receive in the mail after your visit with us. Thank you!             Your Updated Medication List - Protect others around you: Learn how to safely use, store and throw away your medicines at www.disposemymeds.org.          This list is accurate as of 9/14/18 12:09 PM.  Always use your most recent med list.                   Brand Name Dispense Instructions for use Diagnosis    acetaminophen 325 MG tablet    TYLENOL    100 tablet    Take 1-2 tablets (325-650 mg) by mouth every 6 hours as needed for mild pain or fever    Aspiration pneumonitis (H)       ALPRAZolam 0.25 MG tablet    XANAX    30 tablet    Take 1 tablet (0.25 mg) by mouth 3 times daily    Anxiety       calcium carbonate 500 mg-vitamin D 200 units 500-200 MG-UNIT per tablet    OSCAL with D;OYSTER SHELL CALCIUM     Take 1 tablet by mouth 2 times daily (with meals)        ciclopirox 0.77 % cream    LOPROX    30 g    APPLY EXTERNALLY TO THE AFFECTED AREA TWICE DAILY    Encounter for  medication refill       cimetidine 400 MG tablet    TAGAMET    90 tablet    TAKE 1 TABLET BY MOUTH AT BEDTIME    Medication refill, Acid indigestion       folic acid 1 MG tablet    FOLVITE     Take 1 mg by mouth 3 times daily.        lisinopril 2.5 MG tablet    PRINIVIL/Zestril    90 tablet    TAKE 1 TABLET BY MOUTH DAILY    Encounter for medication refill       METHOTREXATE PO      Take 2.5 mg by mouth once a week 5 tabs = 12.5 mg wed or thur        NYSTOP 612428 UNIT/GM Powd   Generic drug:  nystatin     60 g    APPLY TOPICALLY TWICE DAILY AS NEEDED    Yeast dermatitis       * primidone 50 MG tablet    MYSOLINE     Take 50 mg by mouth every morning        * primidone 50 MG tablet    MYSOLINE     Take 50 mg by mouth daily (with lunch)        * primidone 50 MG tablet    MYSOLINE     Take 25 mg by mouth daily (with dinner)        VITAMIN D (CHOLECALCIFEROL) PO      Take 2,000 Units by mouth daily        * Notice:  This list has 3 medication(s) that are the same as other medications prescribed for you. Read the directions carefully, and ask your doctor or other care provider to review them with you.

## 2018-09-14 NOTE — PROGRESS NOTES
Problem(s) Oriented visit        SUBJECTIVE:                                                    Nelia Solano is a 89 year old female who presents to clinic today for the following health issues : Nelia Solano is a 89 year old female who presents to clinic today for the following health issues: Here with  to get paper work filled out to get a hospital bed. OT and PT has recommended a hospital bed. Pt is wheelchair bound and needs a bed that the height can adjust on for transfers. Was in hospital for aspiration pneumonia and now has [facility] homecare, hospice, PT, OT, SP.Patient with dysphagia and there is no safe oral diet for her.  Is on a liquid, soft food diet at home. Head of bed should be elevated to prevent aspiration. 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    has to turn pt when back hurts so she can sleep on right side. Pt will wake 4-6 times per night to reposition due to pain and inability to use legs. Pt is unable to reposition self due to deconditioning and  inclusion body myositis.      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   Additional history: as documented    ROS:  5 point ROS completed and negative except noted above, including Gen, CV, Resp, GI, MS    Histories:   Patient Active Problem List   Diagnosis     Inclusion body myositis     Acid indigestion     Impaired glucose tolerance     Spinal stenosis, lumbar     Tremor     Vertigo     Hypertension     Health Care Home     Tibia/fibula fracture     Essential tremor     Physical deconditioning     Anemia, unspecified anemia type      Aspiration pneumonitis (H)     Pneumonia of left lower lobe due to infectious organism (H)     Weakness     Aspiration pneumonia (H)     Past Surgical History:   Procedure Laterality Date     HYSTERECTOMY       HYSTERECTOMY SUPRACERVICAL, BILATERAL SALPINGO-OOPHORECTOMY, COMBINED      cystadenomaadenoma     JOINT REPLACEMENT       OPEN REDUCTION INTERNAL FIXATION TIBIA Right 1/27/2015    Procedure: OPEN REDUCTION INTERNAL FIXATION TIBIA;  Surgeon: Rocco Campbell MD;  Location:  OR     ORTHOPEDIC SURGERY  hip       Social History   Substance Use Topics     Smoking status: Never Smoker     Smokeless tobacco: Never Used     Alcohol use 3.0 oz/week     6 drink(s) per week     No family history on file.        OBJECTIVE:                                                    /82  Pulse 103  Resp 16  SpO2 93%  There is no height or weight on file to calculate BMI.   APPEARANCE: = Relaxed and in no distress, wheel chair bound  Conj/Eyelids = noninjected and lids and lashes are without inflammation  PERRLA/Irises = Pupils Round Reactive to Light and Irisis without inflammation  Ears/Nose = External structures and Nares have usual shape and form  Resp effort = Calm regular breathing  Breath Sounds = Good air movement with no rales or rhonchi in any lung fields  Heart Rate, Rhythm, & sounds (no Murm)  = Regular rate and rhythm with no S3, S4, or murmur appreciated.  Musculsktl: No joint swelling, erythema, or tenderness  SKIN = absent significant rashes or lesions   Recent/Remote Memory = Alert and Oriented x 3  Mood/Affect = Cooperative and interested     ASSESSMENT/PLAN:                                                    1. Spinal stenosis of lumbar region without neurogenic claudication  Hospital bed recommended and paper work filled out and faxed.    2. Inclusion body myositis  Hospital bed recommended and paper work filled out and faxed.    3. Aspiration pneumonitis (H)  Hospital bed recommended and  paper work filled out and faxed.    4. Dysphagia, unspecified type  Hospital bed recommended and paper work filled out and faxed.    5. Physical deconditioning  Hospital bed recommended and paper work filled out and faxed.    6. Tremor  Hospital bed recommended and paper work filled out and faxed.    FUTURE APPOINTMENTS:       - Follow-up for annual visit or as needed    The following health maintenance items are reviewed in Epic and correct as of today:  Health Maintenance   Topic Date Due     FOOT EXAM Q1 YEAR  04/27/1929     EYE EXAM Q1 YEAR  04/27/1929     LIPID MONITORING Q1 YEAR  04/27/1929     MICROALBUMIN Q1 YEAR  04/27/1929     PHQ-2 Q1 YR  04/27/1940     MEDICARE ANNUAL WELLNESS VISIT  04/27/1946     TETANUS IMMUNIZATION (SYSTEM ASSIGNED)  04/27/1946     TSH W/ FREE T4 REFLEX Q2 YEAR  10/10/2013     A1C Q6 MO  04/27/2015     FALL RISK ASSESSMENT  11/07/2017     INFLUENZA VACCINE (1) 09/01/2018     CREATININE Q1 YEAR  08/22/2019     ADVANCE DIRECTIVE PLANNING Q5 YRS  08/22/2023     PNEUMOCOCCAL  Completed       CARA Estrella CNP  [facility]  Family Practice  [facility]  [phone]    For any issues my office # is [phone]

## 2018-09-15 DIAGNOSIS — Z76.0 ENCOUNTER FOR MEDICATION REFILL: ICD-10-CM

## 2018-09-16 DIAGNOSIS — Z76.0 ENCOUNTER FOR MEDICATION REFILL: ICD-10-CM

## 2018-09-16 RX ORDER — LISINOPRIL 2.5 MG/1
TABLET ORAL
Qty: 90 TABLET | Refills: 0 | Status: SHIPPED | OUTPATIENT
Start: 2018-09-16 | End: 2018-12-11

## 2018-09-16 RX ORDER — CICLOPIROX OLAMINE 7.7 MG/G
CREAM TOPICAL
Qty: 30 G | Refills: 0 | Status: SHIPPED | OUTPATIENT
Start: 2018-09-16

## 2018-09-21 ENCOUNTER — MEDICAL CORRESPONDENCE (OUTPATIENT)
Dept: FAMILY MEDICINE | Facility: CLINIC | Age: 83
End: 2018-09-21

## 2018-09-23 ENCOUNTER — MEDICAL CORRESPONDENCE (OUTPATIENT)
Dept: FAMILY MEDICINE | Facility: CLINIC | Age: 83
End: 2018-09-23

## 2018-09-24 ENCOUNTER — MEDICAL CORRESPONDENCE (OUTPATIENT)
Dept: FAMILY MEDICINE | Facility: CLINIC | Age: 83
End: 2018-09-24

## 2018-09-25 ENCOUNTER — MEDICAL CORRESPONDENCE (OUTPATIENT)
Dept: FAMILY MEDICINE | Facility: CLINIC | Age: 83
End: 2018-09-25

## 2018-09-26 ENCOUNTER — MEDICAL CORRESPONDENCE (OUTPATIENT)
Dept: FAMILY MEDICINE | Facility: CLINIC | Age: 83
End: 2018-09-26

## 2018-09-28 ENCOUNTER — MEDICAL CORRESPONDENCE (OUTPATIENT)
Dept: FAMILY MEDICINE | Facility: CLINIC | Age: 83
End: 2018-09-28

## 2018-12-11 DIAGNOSIS — Z76.0 ENCOUNTER FOR MEDICATION REFILL: ICD-10-CM

## 2018-12-11 NOTE — TELEPHONE ENCOUNTER
lisinopril (PRINIVIL/ZESTRIL) 2.5 MG   LAST  Med check 8/14/13   last labs(for RX) 8/22/18  Next  appt scheduled =  none  Gilda Monsivais MA    BP Readings from Last 3 Encounters:   09/14/18 126/82   08/25/18 132/75   07/18/18 114/72     Last Comprehensive Metabolic Panel:  Sodium   Date Value Ref Range Status   08/22/2018 137 133 - 144 mmol/L Final     Potassium   Date Value Ref Range Status   08/22/2018 3.9 3.4 - 5.3 mmol/L Final     Chloride   Date Value Ref Range Status   08/22/2018 103 94 - 109 mmol/L Final     Carbon Dioxide   Date Value Ref Range Status   08/22/2018 26 20 - 32 mmol/L Final     Anion Gap   Date Value Ref Range Status   08/22/2018 8 3 - 14 mmol/L Final     Glucose   Date Value Ref Range Status   08/22/2018 114 (H) 70 - 99 mg/dL Final     Urea Nitrogen   Date Value Ref Range Status   08/22/2018 10 7 - 30 mg/dL Final     Creatinine   Date Value Ref Range Status   08/22/2018 0.39 (L) 0.52 - 1.04 mg/dL Final     GFR Estimate   Date Value Ref Range Status   08/22/2018 >90 >60 mL/min/1.7m2 Final     Comment:     Non  GFR Calc     Calcium   Date Value Ref Range Status   08/22/2018 7.6 (L) 8.5 - 10.1 mg/dL Final

## 2018-12-12 RX ORDER — LISINOPRIL 2.5 MG/1
TABLET ORAL
Qty: 90 TABLET | Refills: 0 | Status: SHIPPED | OUTPATIENT
Start: 2018-12-12 | End: 2019-03-12

## 2018-12-21 DIAGNOSIS — B37.2 YEAST DERMATITIS: ICD-10-CM

## 2018-12-21 RX ORDER — NYSTATIN 100000 [USP'U]/G
POWDER TOPICAL
Qty: 60 G | Refills: 0 | Status: SHIPPED | OUTPATIENT
Start: 2018-12-21 | End: 2019-02-24

## 2019-02-24 DIAGNOSIS — B37.2 YEAST DERMATITIS: ICD-10-CM

## 2019-02-25 RX ORDER — NYSTATIN 100000 [USP'U]/G
POWDER TOPICAL
Qty: 60 G | Refills: 0 | Status: SHIPPED | OUTPATIENT
Start: 2019-02-25 | End: 2019-05-07

## 2019-03-12 DIAGNOSIS — Z76.0 ENCOUNTER FOR MEDICATION REFILL: ICD-10-CM

## 2019-03-12 RX ORDER — LISINOPRIL 2.5 MG/1
2.5 TABLET ORAL DAILY
Qty: 90 TABLET | Refills: 0 | Status: SHIPPED | OUTPATIENT
Start: 2019-03-12 | End: 2019-06-10

## 2019-03-12 NOTE — TELEPHONE ENCOUNTER
Lisinopril  Last OV 9/14/18  Last Labs 8/22/18    BP Readings from Last 3 Encounters:   09/14/18 126/82   08/25/18 132/75   07/18/18 114/72     Last Comprehensive Metabolic Panel:  Sodium   Date Value Ref Range Status   08/22/2018 137 133 - 144 mmol/L Final     Potassium   Date Value Ref Range Status   08/22/2018 3.9 3.4 - 5.3 mmol/L Final     Chloride   Date Value Ref Range Status   08/22/2018 103 94 - 109 mmol/L Final     Carbon Dioxide   Date Value Ref Range Status   08/22/2018 26 20 - 32 mmol/L Final     Anion Gap   Date Value Ref Range Status   08/22/2018 8 3 - 14 mmol/L Final     Glucose   Date Value Ref Range Status   08/22/2018 114 (H) 70 - 99 mg/dL Final     Urea Nitrogen   Date Value Ref Range Status   08/22/2018 10 7 - 30 mg/dL Final     Creatinine   Date Value Ref Range Status   08/22/2018 0.39 (L) 0.52 - 1.04 mg/dL Final     GFR Estimate   Date Value Ref Range Status   08/22/2018 >90 >60 mL/min/1.7m2 Final     Comment:     Non  GFR Calc     Calcium   Date Value Ref Range Status   08/22/2018 7.6 (L) 8.5 - 10.1 mg/dL Final

## 2019-03-13 ENCOUNTER — TELEPHONE (OUTPATIENT)
Dept: FAMILY MEDICINE | Facility: CLINIC | Age: 84
End: 2019-03-13

## 2019-03-13 DIAGNOSIS — G72.41 INCLUSION BODY MYOSITIS (H): Primary | ICD-10-CM

## 2019-03-13 DIAGNOSIS — Z99.3 WHEELCHAIR BOUND: ICD-10-CM

## 2019-03-13 DIAGNOSIS — M48.061 SPINAL STENOSIS, LUMBAR: ICD-10-CM

## 2019-03-13 DIAGNOSIS — R23.8 SKIN BREAKDOWN: ICD-10-CM

## 2019-03-13 NOTE — TELEPHONE ENCOUNTER
"3/13/19 Sukumar, nurse with [facility], calls requesting order be faxed to [facility] for new wheelchair cushion. Patient is wheelchair bound and has slight skin breakdown on buttocks.   Would like order for: 18\"x18\" Roho mosaic seat cushion  [facility] phone: [phone]  Fax:[phone]  Referral/order faxed       "

## 2019-03-18 ENCOUNTER — MEDICAL CORRESPONDENCE (OUTPATIENT)
Dept: FAMILY MEDICINE | Facility: CLINIC | Age: 84
End: 2019-03-18

## 2019-05-07 DIAGNOSIS — B37.2 YEAST DERMATITIS: ICD-10-CM

## 2019-05-07 RX ORDER — NYSTATIN 100000 [USP'U]/G
POWDER TOPICAL
Qty: 60 G | Refills: 0 | Status: SHIPPED | OUTPATIENT
Start: 2019-05-07 | End: 2019-07-01

## 2019-06-10 DIAGNOSIS — Z76.0 ENCOUNTER FOR MEDICATION REFILL: ICD-10-CM

## 2019-06-11 RX ORDER — LISINOPRIL 2.5 MG/1
TABLET ORAL
Qty: 90 TABLET | Refills: 0 | Status: SHIPPED | OUTPATIENT
Start: 2019-06-11 | End: 2019-09-07

## 2019-06-24 ENCOUNTER — OFFICE VISIT (OUTPATIENT)
Dept: FAMILY MEDICINE | Facility: CLINIC | Age: 84
End: 2019-06-24

## 2019-06-24 VITALS
OXYGEN SATURATION: 97 % | SYSTOLIC BLOOD PRESSURE: 122 MMHG | RESPIRATION RATE: 15 BRPM | DIASTOLIC BLOOD PRESSURE: 74 MMHG | HEART RATE: 88 BPM

## 2019-06-24 DIAGNOSIS — K30 ACID INDIGESTION: ICD-10-CM

## 2019-06-24 DIAGNOSIS — G72.41 INCLUSION BODY MYOSITIS (H): ICD-10-CM

## 2019-06-24 DIAGNOSIS — R25.1 TREMOR: ICD-10-CM

## 2019-06-24 DIAGNOSIS — R53.1 WEAKNESS: ICD-10-CM

## 2019-06-24 DIAGNOSIS — Z01.818 PREOP GENERAL PHYSICAL EXAM: Primary | ICD-10-CM

## 2019-06-24 DIAGNOSIS — I10 ESSENTIAL HYPERTENSION: ICD-10-CM

## 2019-06-24 DIAGNOSIS — R53.81 PHYSICAL DECONDITIONING: ICD-10-CM

## 2019-06-24 DIAGNOSIS — I82.4Y2 DEEP VEIN THROMBOSIS (DVT) OF PROXIMAL VEIN OF LEFT LOWER EXTREMITY, UNSPECIFIED CHRONICITY (H): ICD-10-CM

## 2019-06-24 DIAGNOSIS — G25.0 ESSENTIAL TREMOR: ICD-10-CM

## 2019-06-24 DIAGNOSIS — R73.02 IMPAIRED GLUCOSE TOLERANCE: ICD-10-CM

## 2019-06-24 DIAGNOSIS — M48.062 SPINAL STENOSIS OF LUMBAR REGION WITH NEUROGENIC CLAUDICATION: ICD-10-CM

## 2019-06-24 PROBLEM — J69.0 ASPIRATION PNEUMONIA (H): Status: RESOLVED | Noted: 2018-08-21 | Resolved: 2019-06-24

## 2019-06-24 PROBLEM — J69.0 ASPIRATION PNEUMONITIS (H): Status: RESOLVED | Noted: 2018-08-21 | Resolved: 2019-06-24

## 2019-06-24 PROBLEM — J18.9 PNEUMONIA OF LEFT LOWER LOBE DUE TO INFECTIOUS ORGANISM: Status: RESOLVED | Noted: 2018-08-21 | Resolved: 2019-06-24

## 2019-06-24 PROCEDURE — 99215 OFFICE O/P EST HI 40 MIN: CPT | Performed by: FAMILY MEDICINE

## 2019-06-24 NOTE — LETTER
[facility] [address] [place]  [address]  Phone: [phone]    June 24, 2019      Re:Nelia Solano                                                                                                                                [address]  [facility] [address]      To whom it may concern.    I was able to see Nelia Solano today for a Diagnoses of Deep vein thrombosis (DVT) of proximal vein of left lower extremity, unspecified chronicity (H), Essential tremor, Acid indigestion, Impaired glucose tolerance, Essential hypertension, Inclusion body myositis, Spinal stenosis of lumbar region with neurogenic claudication, Tremor, Weakness, and Physical deconditioning were also pertinent to this visit.    She is evaluated for a Neul Power Chair.    She has been confined to a wheelchair for 5+ years.  She currently uses a sofia lift with the help of her  for her personal cares. They are losing the strength to push a regular chair around.      Please allow this letter to serve as my prescription for a power lift chair.          Pavan Kern M.D.

## 2019-06-24 NOTE — PROGRESS NOTES
[facility]  [address] [place] [id number] [phone]  Dept: [phone]    PRE-OP EVALUATION:  Today's date: 2019    Nelia Solano (: 1928) presents for pre-operative evaluation assessment as requested by Dr. Franklin.  She requires evaluation and anesthesia risk assessment prior to undergoing surgery/procedure for treatment of cataracts .    Proposed Surgery/ Procedure: Cataract Removal  Date of Surgery/ Procedure: 19  Time of Surgery/ Procedure:   Hospital/Surgical Facility: [facility]  Fax number for surgical facility: [phone]  Primary Physician: Pavan Kern  Type of Anesthesia Anticipated: to be determined    Patient has a Health Care Directive or Living Will:  YES     1. NO - Do you have a history of heart attack, stroke, stent, bypass or surgery on an artery in the head, neck, heart or legs?  2. NO - Do you ever have any pain or discomfort in your chest?  3. NO - Do you have a history of  Heart Failure?  4. NO - Are you troubled by shortness of breath when: walking on the level, up a slight hill or at night?  5. NO - Do you currently have a cold, bronchitis or other respiratory infection?  6. NO - Do you have a cough, shortness of breath or wheezing?  7. NO - Do you sometimes get pains in the calves of your legs when you walk?  8. NO - Do you or anyone in your family have previous history of blood clots?  9. NO - Do you or does anyone in your family have a serious bleeding problem such as prolonged bleeding following surgeries or cuts?  10. NO - Have you ever had problems with anemia or been told to take iron pills?  11. NO - Have you had any abnormal blood loss such as black, tarry or bloody stools, or abnormal vaginal bleeding?  12. NO - Have you ever had a blood transfusion?  13. NO - Have you or any of your relatives ever had problems with anesthesia?  14. NO - Do you have sleep apnea, excessive snoring or daytime drowsiness?  15.  NO - Do you have any prosthetic heart valves?  16. NO - Do you have prosthetic joints?  17. NO - Is there any chance that you may be pregnant?      HPI:     HPI related to upcoming procedure: slowly worsening cataract on the right now ready for intervention      See problem list for active medical problems.  Problems all longstanding and stable, except as noted/documented.  See ROS for pertinent symptoms related to these conditions.    Inclusion body myositis that makes her quite weak in the legs.    Has had Impaired glucose tolerance in past.  Last   Lab Results   Component Value Date    A1C 5.9 10/27/2014    A1C 5.9 10/10/2011    A1C 6.4 12/13/2010    A1C 6.2 11/02/2005     Has history of hyperlipidemia.  On statin for this, denies any significant side effects of this medication.      Latest labs reviewed:    Is wheel chair bound     MEDICAL HISTORY:     Patient Active Problem List    Diagnosis Date Noted     Deep vein thrombosis (DVT) of proximal vein of left lower extremity, unspecified chronicity (H) 06/24/2019     Priority: Medium     Weakness 08/21/2018     Priority: Medium     Anemia, unspecified anemia type 10/22/2015     Priority: Medium     Essential tremor 02/02/2015     Priority: Medium     Physical deconditioning 02/02/2015     Priority: Medium     Health Care Home 07/18/2013     Priority: Medium     State Tier level 1  October 1, 2014         Hypertension      Priority: Medium     Vertigo 10/10/2011     Priority: Medium     Tremor      Priority: Medium     Inclusion body myositis      Priority: Medium     Acid indigestion      Priority: Medium     Impaired glucose tolerance      Priority: Medium     (Problem list name updated by automated process. Provider to review and confirm.)       Spinal stenosis, lumbar      Priority: Medium      Past Medical History:   Diagnosis Date     Acid indigestion      Anxiety      Dysphagia      HTN, goal below 140/90      Hyperlipidaemia LDL goal < 100       Hypertension      IGT (impair glucose tolerance)      Inclusion body myositis      Spinal stenosis, lumbar      Tremor      Vertigo      Past Surgical History:   Procedure Laterality Date     HYSTERECTOMY       HYSTERECTOMY SUPRACERVICAL, BILATERAL SALPINGO-OOPHORECTOMY, COMBINED      cystadenomaadenoma     JOINT REPLACEMENT       OPEN REDUCTION INTERNAL FIXATION TIBIA Right 1/27/2015    Procedure: OPEN REDUCTION INTERNAL FIXATION TIBIA;  Surgeon: Rocco Campbell MD;  Location:  OR     ORTHOPEDIC SURGERY  hip     Current Outpatient Medications   Medication Sig Dispense Refill     acetaminophen (TYLENOL) 325 MG tablet Take 1-2 tablets (325-650 mg) by mouth every 6 hours as needed for mild pain or fever 100 tablet 0     ALPRAZolam (XANAX) 0.25 MG tablet Take 1 tablet (0.25 mg) by mouth 3 times daily 30 tablet 0     calcium carb 1250 mg, 500 mg [place],/vitamin D 200 units (OSCAL WITH D) 500-200 MG-UNIT per tablet Take 1 tablet by mouth 2 times daily (with meals)       ciclopirox (LOPROX) 0.77 % cream APPLY EXTERNALLY TO THE AFFECTED AREA TWICE DAILY 30 g 0     cimetidine (TAGAMET) 400 MG tablet TAKE 1 TABLET BY MOUTH AT BEDTIME 90 tablet 3     folic acid (FOLVITE) 1 MG tablet Take 1 mg by mouth 3 times daily.       lisinopril (PRINIVIL/ZESTRIL) 2.5 MG tablet TAKE 1 TABLET(2.5 MG) BY MOUTH DAILY 90 tablet 0     Methotrexate Sodium (METHOTREXATE PO) Take 2.5 mg by mouth once a week 5 tabs = 12.5 mg wed or thur       NYSTOP 399586 UNIT/GM powder APPLY TOPICALLY TWICE DAILY AS NEEDED 60 g 0     primidone (MYSOLINE) 50 MG tablet Take 50 mg by mouth every morning       primidone (MYSOLINE) 50 MG tablet Take 50 mg by mouth daily (with lunch)       primidone (MYSOLINE) 50 MG tablet Take 25 mg by mouth daily (with dinner)       VITAMIN D, CHOLECALCIFEROL, PO Take 2,000 Units by mouth daily       OTC products: None, except as noted above    No Known Allergies   Latex Allergy: NO    Social History     Tobacco Use      Smoking status: Never Smoker     Smokeless tobacco: Never Used   Substance Use Topics     Alcohol use: Yes     Alcohol/week: 3.0 oz     Types: 6 drink(s) per week     History   Drug Use No       REVIEW OF SYSTEMS:   Constitutional, neuro, ENT, endocrine, pulmonary, cardiac, gastrointestinal, genitourinary, musculoskeletal, integument and psychiatric systems are negative, except as otherwise noted.    EXAM:   /74   Pulse 88   Resp 15   SpO2 97%     GENERAL APPEARANCE: healthy, alert and no distress    GENERAL APPEARANCE: obvious resting tremo     EYES: EOMI, PERRL     HENT: ear canals and TM's normal and nose and mouth without ulcers or lesions     NECK: no adenopathy, no asymmetry, masses, or scars and thyroid normal to palpation     RESP: lungs clear to auscultation - no rales, rhonchi or wheezes     CV: regular rates and rhythm, normal S1 S2, no S3 or S4 and no murmur, click or rub     ABDOMEN: soft, nontender, without hepatosplenomegaly or masses and collapsed forward     MS: wheelchair bound, resting tremor, unable to clinich hands, the fingers do not have any  strength, decreased range of motion in most joints due to weakness.     SKIN: no suspicious lesions or rashes     NEURO: dignificant resting tremor and weakness.     PSYCH: mentation appears normal. and affect normal/bright     LYMPHATICS: No cervical adenopathy    DIAGNOSTICS:   No labs or EKG required for low risk surgery (cataract, skin procedure, breast biopsy, etc)    Recent Labs   Lab Test 08/22/18  0846 08/21/18  1538  01/26/15  0027  10/27/14  10/10/11  0700   HGB 12.2 12.3   < > 13.1   < >  --    < > 13.0    213   < > 178   < >  --    < > 119*   INR  --   --   --  1.00  --   --   --  1.01    134   < > 138   < >  --    < > 131*   POTASSIUM 3.9 4.4   < > 4.3   < >  --    < > 3.8   CR 0.39* 0.46*   < > 0.40*   < >  --    < > 0.47*   A1C  --   --   --   --   --  5.9  --  5.9    < > = values in this interval not displayed.         IMPRESSION:   Reason for surgery/procedure: Mature right cataract, ready for repair.    The proposed surgical procedure is considered LOW risk.    REVISED CARDIAC RISK INDEX  The patient has the following serious cardiovascular risks for perioperative complications such as (MI, PE, VFib and 3  AV Block):  No serious cardiac risks  INTERPRETATION: 1 risks: Class II (low risk - 0.9% complication rate)    The patient has the following additional risks for perioperative complications:  No identified additional risks      ICD-10-CM    1. Preop general physical exam Z01.818    2. Deep vein thrombosis (DVT) of proximal vein of left lower extremity, unspecified chronicity (H) I82.4Y2    3. Essential tremor G25.0    4. Acid indigestion K30    5. Impaired glucose tolerance R73.02    6. Essential hypertension I10    7. Inclusion body myositis G72.41    8. Spinal stenosis of lumbar region with neurogenic claudication M48.062    9. Tremor R25.1    10. Weakness R53.1    11. Physical deconditioning R53.81        RECOMMENDATIONS:         --Patient is to take all scheduled medications on the day of surgery EXCEPT for modifications listed below.    APPROVAL GIVEN to proceed with proposed procedure, without further diagnostic evaluation       Signed Electronically by: Pavan Kern MD    Copy of this evaluation report is provided to requesting physician.    [facility] Preop Guidelines    Revised Cardiac Risk Index

## 2019-06-27 NOTE — PROGRESS NOTES
6/24/19 faxed preop to [facility] @ [phone]    Amol Guy,   [facility]  [phone]

## 2019-07-01 DIAGNOSIS — B37.2 YEAST DERMATITIS: ICD-10-CM

## 2019-07-02 RX ORDER — NYSTATIN 100000 [USP'U]/G
POWDER TOPICAL
Qty: 60 G | Refills: 0 | Status: SHIPPED | OUTPATIENT
Start: 2019-07-02 | End: 2019-10-17

## 2019-07-29 ENCOUNTER — TELEPHONE (OUTPATIENT)
Dept: FAMILY MEDICINE | Facility: CLINIC | Age: 84
End: 2019-07-29

## 2019-07-29 NOTE — TELEPHONE ENCOUNTER
"Karissa from [facility] dept called stating patient is there today for a wheelchair evaluation and they need an order for this from Dr. Kern.   Patient is wheelchair bound. Called Karissa to clarify what is needed on the order - is patient getting a new wheelchair or parts/cushions etc?  Karissa advises we just need to fax order for \"Wheelchair Evaluation\" - the eval will determine if anything is needed and then more specific orders will be requested.   Plan: ok per Dr. Kern. Written order faxed to rehab dept at fax: [phone]  Maria Victoria Tena RN    "

## 2019-08-02 ENCOUNTER — TRANSFERRED RECORDS (OUTPATIENT)
Dept: FAMILY MEDICINE | Facility: CLINIC | Age: 84
End: 2019-08-02

## 2019-09-07 DIAGNOSIS — Z76.0 ENCOUNTER FOR MEDICATION REFILL: ICD-10-CM

## 2019-09-08 RX ORDER — LISINOPRIL 2.5 MG/1
TABLET ORAL
Qty: 90 TABLET | Refills: 0 | Status: SHIPPED | OUTPATIENT
Start: 2019-09-08 | End: 2019-12-03

## 2019-09-09 ENCOUNTER — MEDICAL CORRESPONDENCE (OUTPATIENT)
Dept: FAMILY MEDICINE | Facility: CLINIC | Age: 84
End: 2019-09-09

## 2019-09-19 ENCOUNTER — MEDICAL CORRESPONDENCE (OUTPATIENT)
Dept: FAMILY MEDICINE | Facility: CLINIC | Age: 84
End: 2019-09-19

## 2019-10-17 DIAGNOSIS — B37.2 YEAST DERMATITIS: ICD-10-CM

## 2019-10-17 RX ORDER — NYSTATIN 100000 [USP'U]/G
POWDER TOPICAL
Qty: 60 G | Refills: 0 | Status: SHIPPED | OUTPATIENT
Start: 2019-10-17 | End: 2020-01-26

## 2019-11-01 ENCOUNTER — OFFICE VISIT (OUTPATIENT)
Dept: FAMILY MEDICINE | Facility: CLINIC | Age: 84
End: 2019-11-01

## 2019-11-01 VITALS
HEART RATE: 85 BPM | SYSTOLIC BLOOD PRESSURE: 104 MMHG | RESPIRATION RATE: 16 BRPM | DIASTOLIC BLOOD PRESSURE: 66 MMHG | OXYGEN SATURATION: 95 %

## 2019-11-01 DIAGNOSIS — R53.1 WEAKNESS: ICD-10-CM

## 2019-11-01 DIAGNOSIS — M48.062 SPINAL STENOSIS OF LUMBAR REGION WITH NEUROGENIC CLAUDICATION: ICD-10-CM

## 2019-11-01 DIAGNOSIS — R73.02 IMPAIRED GLUCOSE TOLERANCE: ICD-10-CM

## 2019-11-01 DIAGNOSIS — R53.81 PHYSICAL DECONDITIONING: ICD-10-CM

## 2019-11-01 DIAGNOSIS — R42 VERTIGO: ICD-10-CM

## 2019-11-01 DIAGNOSIS — G25.0 ESSENTIAL TREMOR: Primary | ICD-10-CM

## 2019-11-01 DIAGNOSIS — I10 ESSENTIAL HYPERTENSION: ICD-10-CM

## 2019-11-01 DIAGNOSIS — G72.41 INCLUSION BODY MYOSITIS (H): ICD-10-CM

## 2019-11-01 PROCEDURE — 99214 OFFICE O/P EST MOD 30 MIN: CPT | Performed by: FAMILY MEDICINE

## 2019-11-01 NOTE — PROGRESS NOTES
Problem(s) Oriented visit    Patient here today for motorized wheelchair face to face.   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  She currently lives in her own home with her .  She is unable to use a power scooter due to her lack of muscle strength.   Ability to perform MRADls  As she is unable to stand up she is unable to bathe dress or perform her toileting on her own.  She is able to feed herself though her  must purée any prepared food as she is only able to take a liquid diet, and she can groom herself.      ROS:  5 point ROS completed and negative except noted above, including Gen, CV, Resp, GI, MS     HISTORY:   reports current alcohol use of about 5.0 standard drinks of alcohol per week.   reports that she has never smoked. She has never used smokeless tobacco.    Past Medical History:   Diagnosis Date     Acid indigestion      Anxiety      Dysphagia      HTN, goal below 140/90      Hyperlipidaemia LDL goal < 100      Hypertension      IGT (impair glucose tolerance)      Inclusion body myositis      Spinal stenosis, lumbar      Tremor      Vertigo      Past Surgical History:   Procedure Laterality Date     HYSTERECTOMY       HYSTERECTOMY SUPRACERVICAL, BILATERAL SALPINGO-OOPHORECTOMY, COMBINED      cystadenomaadenoma     JOINT REPLACEMENT       OPEN REDUCTION INTERNAL FIXATION TIBIA Right 1/27/2015    Procedure: OPEN REDUCTION INTERNAL FIXATION TIBIA;  Surgeon: Rocco Campbell MD;  Location:  OR     ORTHOPEDIC SURGERY  hip       EXAM:  BP: 104/66   Pulse: 85    Temp: Data Unavailable    Wt Readings from Last 2 Encounters:   08/25/18 66.3 kg (146 lb 2.6 oz)   01/26/15 62 kg (136 lb 11 oz)       BMI= There is no height or weight on file to calculate BMI.    EXAM:  APPEARANCE: = mild distress and cooperative  Conj/Eyelids = noninjected and lids and lashes are without inflammation  PERRLA/Irises = Pupils Round Reactive to Light and Irisis without  inflammation  Neck = No anterior or posterior adenopathy appreciated.  Thyroid = Not enlarged and no masses felt  Resp effort = Calm regular breathing  Breath Sounds = Good air movement with no rales or rhonchi in any lung fields  Heart Rate, Rythym, & sounds (no Murm)  = Regular rate and rythym with no S3, S4, or murmer appreciated.  Carotid Art's = Pulses full and equal and no bruits appreciated  Abdomen = Soft, nontender, no masses, & bowel sounds in all quadrants  Liver/Spleen = Normal span and no splenomegaly noted  Digits and Nails = FROM in all finger joints, no nail dystrophy  Ext (edema) = No pretibial edema noted or elsewhere  Musculsktl: unable to , slumps in any chair due to core weakness, strength throughout is 3/5 in all extremities.  SKIN = absent significant rashes or lesions   Recent/Remote Memory = Alert and Oriented x 3  Mood/Affect = Cooperative and interested      Assessment/Plan:  Nelia was seen today for consult and ear problem.    Diagnoses and all orders for this visit:    Essential tremor    Impaired glucose tolerance    Essential hypertension    Inclusion body myositis    Physical deconditioning    Weakness    Vertigo    Spinal stenosis of lumbar region with neurogenic claudication      Her prognosis will not include any significant improvement in her current status.    Physical exam with functional assessment, shows  she is unable to preform ADL's.  Past use of wheelchair but  is no longer able to push her up ramps etc. Due to his aging.    Other modalities such as a cane/walker/scooter wont meet mobility needs due to her significant weakness.  I have confirmed that patient has sufficient space and ability to operate power wheelchair within home.  She will need cushions due to multiple hours in the chair and also a power feature to adjust her leg elevation.    COUNSELING:   reports that she has never smoked. She has never used smokeless tobacco.    Estimated body mass index  "is 26.73 kg/m  as calculated from the following:    Height as of 8/21/18: 1.575 m (5' 2\").    Weight as of 8/25/18: 66.3 kg (146 lb 2.6 oz).       Appropriate preventive services were discussed with this patient, including applicable screening as appropriate for cardiovascular disease, diabetes, osteopenia/osteoporosis, and glaucoma.  As appropriate for age/gender, discussed screening for colorectal cancer, prostate cancer, breast cancer, and cervical cancer. Checklist reviewing preventive services available has been given to the patient.    Reviewed patients plan of care and provided an AVS. The Basic Care Plan (routine screening as documented in Health Maintenance) for Nelia meets the Care Plan requirement. This Care Plan has been established and reviewed with the  Patient.      The following health maintenance items are reviewed in Epic and correct as of today:  Health Maintenance   Topic Date Due     DTAP/TDAP/TD IMMUNIZATION (1 - Tdap) [birth year]27/1953     ZOSTER IMMUNIZATION (1 of 2) 04/27/1978     MEDICARE ANNUAL WELLNESS VISIT  04/27/1993     FALL RISK ASSESSMENT  11/07/2017     PHQ-2  01/01/2019     ADVANCE CARE PLANNING  08/22/2023     INFLUENZA VACCINE  Completed     PNEUMOCOCCAL IMMUNIZATION 65+ LOW/MEDIUM RISK  Completed     IPV IMMUNIZATION  Aged Out     MENINGITIS IMMUNIZATION  Aged Out       Pavan Kern  [facility]  For any issues my office # is [phone]            "

## 2019-12-03 DIAGNOSIS — Z76.0 ENCOUNTER FOR MEDICATION REFILL: ICD-10-CM

## 2019-12-03 RX ORDER — LISINOPRIL 2.5 MG/1
TABLET ORAL
Qty: 90 TABLET | Refills: 0 | Status: SHIPPED | OUTPATIENT
Start: 2019-12-03 | End: 2020-03-02

## 2020-01-25 DIAGNOSIS — B37.2 YEAST DERMATITIS: ICD-10-CM

## 2020-01-26 RX ORDER — NYSTATIN 100000 [USP'U]/G
POWDER TOPICAL
Qty: 60 G | Refills: 0 | Status: SHIPPED | OUTPATIENT
Start: 2020-01-26 | End: 2020-05-22

## 2020-03-02 DIAGNOSIS — Z76.0 ENCOUNTER FOR MEDICATION REFILL: ICD-10-CM

## 2020-03-02 RX ORDER — LISINOPRIL 2.5 MG/1
TABLET ORAL
Qty: 90 TABLET | Refills: 0 | Status: SHIPPED | OUTPATIENT
Start: 2020-03-02 | End: 2020-05-31

## 2020-03-02 NOTE — TELEPHONE ENCOUNTER
LISINOPRIL  LOV 11/1/19  Last Labs 8/22/18    BP Readings from Last 3 Encounters:   11/01/19 104/66   06/24/19 122/74   09/14/18 126/82     Last Comprehensive Metabolic Panel:  Sodium   Date Value Ref Range Status   08/22/2018 137 133 - 144 mmol/L Final     Potassium   Date Value Ref Range Status   08/22/2018 3.9 3.4 - 5.3 mmol/L Final     Chloride   Date Value Ref Range Status   08/22/2018 103 94 - 109 mmol/L Final     Carbon Dioxide   Date Value Ref Range Status   08/22/2018 26 20 - 32 mmol/L Final     Anion Gap   Date Value Ref Range Status   08/22/2018 8 3 - 14 mmol/L Final     Glucose   Date Value Ref Range Status   08/22/2018 114 (H) 70 - 99 mg/dL Final     Urea Nitrogen   Date Value Ref Range Status   08/22/2018 10 7 - 30 mg/dL Final     Creatinine   Date Value Ref Range Status   08/22/2018 0.39 (L) 0.52 - 1.04 mg/dL Final     GFR Estimate   Date Value Ref Range Status   08/22/2018 >90 >60 mL/min/1.7m2 Final     Comment:     Non  GFR Calc     Calcium   Date Value Ref Range Status   08/22/2018 7.6 (L) 8.5 - 10.1 mg/dL Final

## 2020-05-22 DIAGNOSIS — B37.2 YEAST DERMATITIS: ICD-10-CM

## 2020-05-22 RX ORDER — NYSTATIN 100000 [USP'U]/G
POWDER TOPICAL
Qty: 60 G | Refills: 0 | Status: SHIPPED | OUTPATIENT
Start: 2020-05-22 | End: 2020-07-24

## 2020-05-31 DIAGNOSIS — Z76.0 ENCOUNTER FOR MEDICATION REFILL: ICD-10-CM

## 2020-05-31 RX ORDER — LISINOPRIL 2.5 MG/1
TABLET ORAL
Qty: 90 TABLET | Refills: 0 | Status: SHIPPED | OUTPATIENT
Start: 2020-05-31 | End: 2020-08-30

## 2020-07-23 DIAGNOSIS — B37.2 YEAST DERMATITIS: ICD-10-CM

## 2020-07-24 RX ORDER — NYSTATIN 100000 [USP'U]/G
POWDER TOPICAL
Qty: 60 G | Refills: 0 | Status: SHIPPED | OUTPATIENT
Start: 2020-07-24 | End: 2020-09-10

## 2020-08-29 DIAGNOSIS — Z76.0 ENCOUNTER FOR MEDICATION REFILL: ICD-10-CM

## 2020-08-30 RX ORDER — LISINOPRIL 2.5 MG/1
TABLET ORAL
Qty: 90 TABLET | Refills: 0 | Status: SHIPPED | OUTPATIENT
Start: 2020-08-30 | End: 2020-12-08

## 2020-09-10 DIAGNOSIS — B37.2 YEAST DERMATITIS: ICD-10-CM

## 2020-09-10 RX ORDER — NYSTATIN 100000 [USP'U]/G
POWDER TOPICAL
Qty: 60 G | Refills: 0 | Status: SHIPPED | OUTPATIENT
Start: 2020-09-10 | End: 2020-12-10

## 2020-12-07 DIAGNOSIS — B37.2 YEAST DERMATITIS: ICD-10-CM

## 2020-12-07 DIAGNOSIS — Z76.0 ENCOUNTER FOR MEDICATION REFILL: ICD-10-CM

## 2020-12-07 NOTE — TELEPHONE ENCOUNTER
Spoke with , they will call back to schedule an appointment    Amol Guy,   [facility]  [phone]

## 2020-12-10 RX ORDER — NYSTATIN 100000 [USP'U]/G
POWDER TOPICAL
Qty: 60 G | Refills: 0 | Status: SHIPPED | OUTPATIENT
Start: 2020-12-10 | End: 2021-03-07

## 2020-12-10 RX ORDER — LISINOPRIL 2.5 MG/1
2.5 TABLET ORAL DAILY
Qty: 90 TABLET | Refills: 0 | Status: SHIPPED | OUTPATIENT
Start: 2020-12-10 | End: 2021-02-01 | Stop reason: SINTOL

## 2021-01-01 DIAGNOSIS — B37.2 YEAST DERMATITIS: ICD-10-CM

## 2021-01-01 DIAGNOSIS — Z23 NEED FOR INFLUENZA VACCINATION: Primary | ICD-10-CM

## 2021-01-01 PROCEDURE — 90662 IIV NO PRSV INCREASED AG IM: CPT | Performed by: FAMILY MEDICINE

## 2021-01-01 PROCEDURE — G0008 ADMIN INFLUENZA VIRUS VAC: HCPCS | Performed by: FAMILY MEDICINE

## 2021-01-01 RX ORDER — NYSTATIN 100000 [USP'U]/G
POWDER TOPICAL
Qty: 60 G | Refills: 0 | Status: SHIPPED | OUTPATIENT
Start: 2021-01-01 | End: 2022-01-01

## 2021-01-11 ENCOUNTER — TELEPHONE (OUTPATIENT)
Dept: FAMILY MEDICINE | Facility: CLINIC | Age: 86
End: 2021-01-11

## 2021-01-11 NOTE — TELEPHONE ENCOUNTER
Deb - nurse with [facility]- calls reporting BP very low at 78/48 at home visit today. Checked in both arms with same number.   Patient denied hypotensive symptoms and stated feeling fine. Does admit to poor fluid intake. Nurse sees patient only q 90 days for recertification of private pay A services so rechecking by nurse in home is not an option.   No BP monitor in home.   Last visit in [facility] = 11/1/19 with Dr. Kern. BP then was 104/66.     BP Readings from Last 6 Encounters:   11/01/19 104/66   06/24/19 122/74   09/14/18 126/82   08/25/18 132/75   07/18/18 114/72   08/18/17 116/58     Per nurse,  aware patient is overdue for visit but had been wanting to avoid visit due to COVID risk.  is awaiting nurse's call to see what we recommended today with her low BP.   Nurse Boyd encouraged increase fluid - patient resistant to this saying then has to have brief changed more often.     Plan: advised we will call  today to discuss and schedule visit.   Called patient and scheduled visit with Dr. Kern for 1/14/21. Advised patient to hold her lisinopril until that visit.  Maria Victoria Tnea RN

## 2021-02-01 ENCOUNTER — OFFICE VISIT (OUTPATIENT)
Dept: FAMILY MEDICINE | Facility: CLINIC | Age: 86
End: 2021-02-01

## 2021-02-01 VITALS
OXYGEN SATURATION: 98 % | RESPIRATION RATE: 16 BRPM | DIASTOLIC BLOOD PRESSURE: 66 MMHG | TEMPERATURE: 98.2 F | HEART RATE: 84 BPM | SYSTOLIC BLOOD PRESSURE: 120 MMHG

## 2021-02-01 DIAGNOSIS — Z23 HIGH PRIORITY FOR COVID-19 VIRUS VACCINATION: Primary | ICD-10-CM

## 2021-02-01 DIAGNOSIS — I10 ESSENTIAL HYPERTENSION: ICD-10-CM

## 2021-02-01 DIAGNOSIS — I82.4Y2 DEEP VEIN THROMBOSIS (DVT) OF PROXIMAL VEIN OF LEFT LOWER EXTREMITY, UNSPECIFIED CHRONICITY (H): ICD-10-CM

## 2021-02-01 DIAGNOSIS — G72.41 INCLUSION BODY MYOSITIS (H): ICD-10-CM

## 2021-02-01 PROCEDURE — 91301 PR COVID VAC MODERNA 100 MCG/0.5 ML IM: CPT | Performed by: FAMILY MEDICINE

## 2021-02-01 PROCEDURE — 99213 OFFICE O/P EST LOW 20 MIN: CPT | Performed by: FAMILY MEDICINE

## 2021-02-01 PROCEDURE — 0011A PR COVID VAC MODERNA 100 MCG/0.5 ML IM: CPT | Performed by: FAMILY MEDICINE

## 2021-02-01 NOTE — PROGRESS NOTES
Blood Pressure low at home when a nurse visited..    Problem(s) Oriented visit      ROS:  General and Resp. completed and negative except as noted above     HISTORY:   reports current alcohol use of about 5.0 standard drinks of alcohol per week.   reports that she has never smoked. She has never used smokeless tobacco.    Past Medical History:   Diagnosis Date     Acid indigestion      Anxiety      Dysphagia      HTN, goal below 140/90      Hyperlipidaemia LDL goal < 100      Hypertension      IGT (impair glucose tolerance)      Inclusion body myositis      Spinal stenosis, lumbar      Tremor      Vertigo      Past Surgical History:   Procedure Laterality Date     HYSTERECTOMY       HYSTERECTOMY SUPRACERVICAL, BILATERAL SALPINGO-OOPHORECTOMY, COMBINED      cystadenomaadenoma     JOINT REPLACEMENT       OPEN REDUCTION INTERNAL FIXATION TIBIA Right 1/27/2015    Procedure: OPEN REDUCTION INTERNAL FIXATION TIBIA;  Surgeon: Rocco Campbell MD;  Location:  OR     ORTHOPEDIC SURGERY  hip       EXAM:  BP: 120/66   Pulse: 84    Temp: 98.2    Wt Readings from Last 2 Encounters:   08/25/18 66.3 kg (146 lb 2.6 oz)   01/26/15 62 kg (136 lb 11 oz)       BMI= There is no height or weight on file to calculate BMI.    EXAM:  APPEARANCE: = alert, mild distress and slumped in a wheel chair  Resp effort = Calm regular breathing  Musculsktl: weakness in the legs      Assessment/Plan:  Nelia was seen today for recheck medication.    Diagnoses and all orders for this visit:    High priority for COVID-19 virus vaccination  -     [place] ADMIN COVID VAC MODERNA IM, 1ST DOSE    Deep vein thrombosis (DVT) of proximal vein of left lower extremity, unspecified chronicity (H)    Inclusion body myositis  Reason for weakness  Essential hypertension  Will stop the lisinopril as [facility] at [facility] was 70/48      COUNSELING:   reports that she has never smoked. She has never used smokeless tobacco.    Estimated body mass index is 26.73 kg/m   "as calculated from the following:    Height as of 8/21/18: 1.575 m (5' 2\").    Weight as of 8/25/18: 66.3 kg (146 lb 2.6 oz).       Appropriate preventive services were discussed with this patient, including applicable screening as appropriate for cardiovascular disease, diabetes, osteopenia/osteoporosis, and glaucoma.  As appropriate for age/gender, discussed screening for colorectal cancer, prostate cancer, breast cancer, and cervical cancer. Checklist reviewing preventive services available has been given to the patient.    Reviewed patients plan of care and provided an AVS. The Basic Care Plan (routine screening as documented in Health Maintenance) for Nelia meets the Care Plan requirement. This Care Plan has been established and reviewed with the  Patient.      The following health maintenance items are reviewed in Epic and correct as of today:  Health Maintenance   Topic Date Due     COVID-19 Vaccine (1 of 2) 04/27/1944     DTAP/TDAP/TD IMMUNIZATION (1 - Tdap) 04/27/1953     ZOSTER IMMUNIZATION (1 of 2) 04/27/1978     MEDICARE ANNUAL WELLNESS VISIT  04/27/1993     FALL RISK ASSESSMENT  11/07/2017     INFLUENZA VACCINE (1) 09/01/2020     ADVANCE CARE PLANNING  08/22/2023     PHQ-2  Completed     Pneumococcal Vaccine: 65+ Years  Completed     Pneumococcal Vaccine: Pediatrics (0 to 5 Years) and At-Risk Patients (6 to 64 Years)  Aged Out     IPV IMMUNIZATION  Aged Out     MENINGITIS IMMUNIZATION  Aged Out     HEPATITIS B IMMUNIZATION  Aged Out       Pavan Kern  [facility]  For any issues my office # is [phone]            "

## 2021-03-01 VITALS — TEMPERATURE: 97.2 F

## 2021-03-01 DIAGNOSIS — Z23 HIGH PRIORITY FOR COVID-19 VIRUS VACCINATION: Primary | ICD-10-CM

## 2021-03-01 PROCEDURE — 0012A PR COVID VAC MODERNA 100 MCG/0.5 ML IM: CPT | Performed by: FAMILY MEDICINE

## 2021-03-01 PROCEDURE — 91301 PR COVID VAC MODERNA 100 MCG/0.5 ML IM: CPT | Performed by: FAMILY MEDICINE

## 2021-03-06 DIAGNOSIS — B37.2 YEAST DERMATITIS: ICD-10-CM

## 2021-03-07 RX ORDER — NYSTATIN 100000 [USP'U]/G
POWDER TOPICAL
Qty: 60 G | Refills: 0 | Status: SHIPPED | OUTPATIENT
Start: 2021-03-07 | End: 2021-06-04

## 2021-06-04 DIAGNOSIS — B37.2 YEAST DERMATITIS: ICD-10-CM

## 2021-06-04 RX ORDER — NYSTATIN 100000 [USP'U]/G
POWDER TOPICAL
Qty: 60 G | Refills: 0 | Status: SHIPPED | OUTPATIENT
Start: 2021-06-04 | End: 2021-01-01

## 2022-01-01 ENCOUNTER — LAB (OUTPATIENT)
Dept: LAB | Facility: CLINIC | Age: 87
End: 2022-01-01
Payer: MEDICARE

## 2022-01-01 ENCOUNTER — MEDICAL CORRESPONDENCE (OUTPATIENT)
Dept: FAMILY MEDICINE | Facility: CLINIC | Age: 87
End: 2022-01-01

## 2022-01-01 ENCOUNTER — TELEPHONE (OUTPATIENT)
Dept: NURSING | Facility: CLINIC | Age: 87
End: 2022-01-01

## 2022-01-01 ENCOUNTER — TRANSFERRED RECORDS (OUTPATIENT)
Dept: FAMILY MEDICINE | Facility: CLINIC | Age: 87
End: 2022-01-01

## 2022-01-01 ENCOUNTER — OFFICE VISIT (OUTPATIENT)
Dept: FAMILY MEDICINE | Facility: CLINIC | Age: 87
End: 2022-01-01

## 2022-01-01 ENCOUNTER — APPOINTMENT (OUTPATIENT)
Dept: GENERAL RADIOLOGY | Facility: CLINIC | Age: 87
End: 2022-01-01
Attending: EMERGENCY MEDICINE
Payer: MEDICARE

## 2022-01-01 ENCOUNTER — HOSPITAL ENCOUNTER (EMERGENCY)
Facility: CLINIC | Age: 87
Discharge: HOME | End: 2022-07-28
Attending: EMERGENCY MEDICINE | Admitting: EMERGENCY MEDICINE
Payer: MEDICARE

## 2022-01-01 ENCOUNTER — HOSPITAL ENCOUNTER (EMERGENCY)
Facility: CLINIC | Age: 87
Discharge: HOME | End: 2022-03-26
Attending: EMERGENCY MEDICINE | Admitting: EMERGENCY MEDICINE
Payer: MEDICARE

## 2022-01-01 ENCOUNTER — MEDICAL CORRESPONDENCE (OUTPATIENT)
Dept: HEALTH INFORMATION MANAGEMENT | Facility: CLINIC | Age: 87
End: 2022-01-01

## 2022-01-01 VITALS
SYSTOLIC BLOOD PRESSURE: 104 MMHG | DIASTOLIC BLOOD PRESSURE: 62 MMHG | WEIGHT: 130 LBS | BODY MASS INDEX: 23.78 KG/M2 | RESPIRATION RATE: 22 BRPM | OXYGEN SATURATION: 92 % | HEART RATE: 88 BPM | TEMPERATURE: 99.3 F

## 2022-01-01 VITALS — OXYGEN SATURATION: 98 % | HEART RATE: 82 BPM | SYSTOLIC BLOOD PRESSURE: 112 MMHG | DIASTOLIC BLOOD PRESSURE: 62 MMHG

## 2022-01-01 VITALS
WEIGHT: 98 LBS | RESPIRATION RATE: 20 BRPM | HEART RATE: 99 BPM | DIASTOLIC BLOOD PRESSURE: 54 MMHG | OXYGEN SATURATION: 97 % | TEMPERATURE: 96.9 F | SYSTOLIC BLOOD PRESSURE: 115 MMHG | BODY MASS INDEX: 17.92 KG/M2

## 2022-01-01 DIAGNOSIS — I10 PRIMARY HYPERTENSION: ICD-10-CM

## 2022-01-01 DIAGNOSIS — G72.41 IBM (INCLUSION BODY MYOSITIS) (H): Primary | ICD-10-CM

## 2022-01-01 DIAGNOSIS — R09.02 HYPOXIC EPISODE: ICD-10-CM

## 2022-01-01 DIAGNOSIS — K30 ACID INDIGESTION: ICD-10-CM

## 2022-01-01 DIAGNOSIS — R13.10 DYSPHAGIA, UNSPECIFIED TYPE: ICD-10-CM

## 2022-01-01 DIAGNOSIS — Z76.0 ENCOUNTER FOR MEDICATION REFILL: Primary | ICD-10-CM

## 2022-01-01 DIAGNOSIS — I82.4Y2 DEEP VEIN THROMBOSIS (DVT) OF PROXIMAL VEIN OF LEFT LOWER EXTREMITY, UNSPECIFIED CHRONICITY (H): ICD-10-CM

## 2022-01-01 DIAGNOSIS — G72.41 INCLUSION BODY MYOSITIS (H): ICD-10-CM

## 2022-01-01 DIAGNOSIS — R05.9 COUGH: ICD-10-CM

## 2022-01-01 DIAGNOSIS — B37.2 YEAST DERMATITIS: ICD-10-CM

## 2022-01-01 DIAGNOSIS — N30.00 ACUTE CYSTITIS: ICD-10-CM

## 2022-01-01 DIAGNOSIS — R53.81 PHYSICAL DECONDITIONING: ICD-10-CM

## 2022-01-01 DIAGNOSIS — Z76.0 MEDICATION REFILL: ICD-10-CM

## 2022-01-01 DIAGNOSIS — F41.9 ANXIETY: ICD-10-CM

## 2022-01-01 DIAGNOSIS — Z02.89 ENCOUNTER FOR COMPLETION OF FORM WITH PATIENT: Primary | ICD-10-CM

## 2022-01-01 DIAGNOSIS — I10 ESSENTIAL HYPERTENSION: ICD-10-CM

## 2022-01-01 LAB
ALT SERPL W P-5'-P-CCNC: 26 U/L (ref 0–50)
ANION GAP SERPL CALCULATED.3IONS-SCNC: 5 MMOL/L (ref 3–14)
AST SERPL W P-5'-P-CCNC: 28 U/L (ref 0–45)
ATRIAL RATE - MUSE: 95 BPM
BASOPHILS # BLD AUTO: 0 10E3/UL (ref 0–0.2)
BASOPHILS # BLD AUTO: 0.1 10E3/UL (ref 0–0.2)
BASOPHILS NFR BLD AUTO: 0 %
BASOPHILS NFR BLD AUTO: 0 %
BILIRUB SERPL-MCNC: 0.5 MG/DL (ref 0.2–1.3)
BUN SERPL-MCNC: 12 MG/DL (ref 7–30)
CALCIUM SERPL-MCNC: 9.6 MG/DL (ref 8.5–10.1)
CHLORIDE BLD-SCNC: 106 MMOL/L (ref 94–109)
CO2 SERPL-SCNC: 30 MMOL/L (ref 20–32)
CREAT SERPL-MCNC: 0.52 MG/DL (ref 0.52–1.04)
DIASTOLIC BLOOD PRESSURE - MUSE: NORMAL MMHG
EOSINOPHIL # BLD AUTO: 0.2 10E3/UL (ref 0–0.7)
EOSINOPHIL # BLD AUTO: 0.3 10E3/UL (ref 0–0.7)
EOSINOPHIL NFR BLD AUTO: 2 %
EOSINOPHIL NFR BLD AUTO: 3 %
ERYTHROCYTE [DISTWIDTH] IN BLOOD BY AUTOMATED COUNT: 15.7 % (ref 10–15)
ERYTHROCYTE [DISTWIDTH] IN BLOOD BY AUTOMATED COUNT: 16.1 % (ref 10–15)
FLUAV RNA SPEC QL NAA+PROBE: NEGATIVE
FLUBV RNA RESP QL NAA+PROBE: NEGATIVE
GFR SERPL CREATININE-BSD FRML MDRD: 86 ML/MIN/1.73M2
GLUCOSE BLD-MCNC: 133 MG/DL (ref 70–99)
HCT VFR BLD AUTO: 42.6 % (ref 35–47)
HCT VFR BLD AUTO: 44.3 % (ref 35–47)
HGB BLD-MCNC: 13.7 G/DL (ref 11.7–15.7)
HGB BLD-MCNC: 13.9 G/DL (ref 11.7–15.7)
IMM GRANULOCYTES # BLD: 0 10E3/UL
IMM GRANULOCYTES # BLD: 0 10E3/UL
IMM GRANULOCYTES NFR BLD: 0 %
IMM GRANULOCYTES NFR BLD: 0 %
INTERPRETATION ECG - MUSE: NORMAL
LACTATE SERPL-SCNC: 1.8 MMOL/L (ref 0.7–2)
LYMPHOCYTES # BLD AUTO: 1.7 10E3/UL (ref 0.8–5.3)
LYMPHOCYTES # BLD AUTO: 2 10E3/UL (ref 0.8–5.3)
LYMPHOCYTES NFR BLD AUTO: 14 %
LYMPHOCYTES NFR BLD AUTO: 18 %
MCH RBC QN AUTO: 31.7 PG (ref 26.5–33)
MCH RBC QN AUTO: 31.7 PG (ref 26.5–33)
MCHC RBC AUTO-ENTMCNC: 31.4 G/DL (ref 31.5–36.5)
MCHC RBC AUTO-ENTMCNC: 32.2 G/DL (ref 31.5–36.5)
MCV RBC AUTO: 101 FL (ref 78–100)
MCV RBC AUTO: 99 FL (ref 78–100)
MONOCYTES # BLD AUTO: 0.8 10E3/UL (ref 0–1.3)
MONOCYTES # BLD AUTO: 0.9 10E3/UL (ref 0–1.3)
MONOCYTES NFR BLD AUTO: 6 %
MONOCYTES NFR BLD AUTO: 9 %
NEUTROPHILS # BLD AUTO: 7.7 10E3/UL (ref 1.6–8.3)
NEUTROPHILS # BLD AUTO: 9.7 10E3/UL (ref 1.6–8.3)
NEUTROPHILS NFR BLD AUTO: 71 %
NEUTROPHILS NFR BLD AUTO: 77 %
NRBC # BLD AUTO: 0 10E3/UL
NRBC # BLD AUTO: 0 10E3/UL
NRBC BLD AUTO-RTO: 0 /100
NRBC BLD AUTO-RTO: 0 /100
P AXIS - MUSE: 51 DEGREES
PLATELET # BLD AUTO: 137 10E3/UL (ref 150–450)
PLATELET # BLD AUTO: 92 10E3/UL (ref 150–450)
POTASSIUM BLD-SCNC: 3.5 MMOL/L (ref 3.4–5.3)
PR INTERVAL - MUSE: 148 MS
QRS DURATION - MUSE: 88 MS
QT - MUSE: 348 MS
QTC - MUSE: 437 MS
R AXIS - MUSE: -3 DEGREES
RBC # BLD AUTO: 4.32 10E6/UL (ref 3.8–5.2)
RBC # BLD AUTO: 4.38 10E6/UL (ref 3.8–5.2)
RSV RNA SPEC NAA+PROBE: NEGATIVE
SARS-COV-2 RNA RESP QL NAA+PROBE: NEGATIVE
SODIUM SERPL-SCNC: 141 MMOL/L (ref 133–144)
SYSTOLIC BLOOD PRESSURE - MUSE: NORMAL MMHG
T AXIS - MUSE: -3 DEGREES
VENTRICULAR RATE- MUSE: 95 BPM
WBC # BLD AUTO: 10.8 10E3/UL (ref 4–11)
WBC # BLD AUTO: 12.7 10E3/UL (ref 4–11)

## 2022-01-01 PROCEDURE — 82247 BILIRUBIN TOTAL: CPT

## 2022-01-01 PROCEDURE — 36415 COLL VENOUS BLD VENIPUNCTURE: CPT | Performed by: EMERGENCY MEDICINE

## 2022-01-01 PROCEDURE — 99283 EMERGENCY DEPT VISIT LOW MDM: CPT | Mod: 25

## 2022-01-01 PROCEDURE — 99285 EMERGENCY DEPT VISIT HI MDM: CPT | Mod: 25

## 2022-01-01 PROCEDURE — 85025 COMPLETE CBC W/AUTO DIFF WBC: CPT

## 2022-01-01 PROCEDURE — C9803 HOPD COVID-19 SPEC COLLECT: HCPCS

## 2022-01-01 PROCEDURE — 87637 SARSCOV2&INF A&B&RSV AMP PRB: CPT | Performed by: EMERGENCY MEDICINE

## 2022-01-01 PROCEDURE — 85025 COMPLETE CBC W/AUTO DIFF WBC: CPT | Performed by: EMERGENCY MEDICINE

## 2022-01-01 PROCEDURE — 36415 COLL VENOUS BLD VENIPUNCTURE: CPT

## 2022-01-01 PROCEDURE — 71045 X-RAY EXAM CHEST 1 VIEW: CPT

## 2022-01-01 PROCEDURE — 84460 ALANINE AMINO (ALT) (SGPT): CPT

## 2022-01-01 PROCEDURE — 93005 ELECTROCARDIOGRAM TRACING: CPT

## 2022-01-01 PROCEDURE — 80048 BASIC METABOLIC PNL TOTAL CA: CPT | Performed by: EMERGENCY MEDICINE

## 2022-01-01 PROCEDURE — 99213 OFFICE O/P EST LOW 20 MIN: CPT | Performed by: FAMILY MEDICINE

## 2022-01-01 PROCEDURE — 83605 ASSAY OF LACTIC ACID: CPT | Performed by: EMERGENCY MEDICINE

## 2022-01-01 PROCEDURE — 71046 X-RAY EXAM CHEST 2 VIEWS: CPT

## 2022-01-01 PROCEDURE — 84450 TRANSFERASE (AST) (SGOT): CPT

## 2022-01-01 RX ORDER — FOLIC ACID 1 MG/1
TABLET ORAL
Qty: 84 TABLET | Refills: 10 | Status: SHIPPED | OUTPATIENT
Start: 2022-01-01

## 2022-01-01 RX ORDER — CHOLECALCIFEROL (VITAMIN D3) 50 MCG
TABLET ORAL
Qty: 28 TABLET | Refills: 10 | Status: SHIPPED | OUTPATIENT
Start: 2022-01-01

## 2022-01-01 RX ORDER — ALPRAZOLAM 0.25 MG
TABLET ORAL
Qty: 30 TABLET | Refills: 5 | Status: SHIPPED | OUTPATIENT
Start: 2022-01-01

## 2022-01-01 RX ORDER — ALPRAZOLAM 0.25 MG
0.25 TABLET ORAL 3 TIMES DAILY
Qty: 30 TABLET | Refills: 0 | Status: SHIPPED | OUTPATIENT
Start: 2022-01-01 | End: 2022-01-01

## 2022-01-01 RX ORDER — CIMETIDINE 400 MG
TABLET ORAL
Qty: 28 TABLET | Refills: 10 | Status: SHIPPED | OUTPATIENT
Start: 2022-01-01

## 2022-01-01 RX ORDER — NYSTATIN 100000 [USP'U]/G
POWDER TOPICAL
Qty: 60 G | Refills: 0 | Status: SHIPPED | OUTPATIENT
Start: 2022-01-01

## 2022-01-01 RX ORDER — PRIMIDONE 50 MG/1
TABLET ORAL
Qty: 42 TABLET | Refills: 10 | Status: SHIPPED | OUTPATIENT
Start: 2022-01-01

## 2022-01-01 ASSESSMENT — ENCOUNTER SYMPTOMS
APPETITE CHANGE: 0
VOMITING: 0
COUGH: 1
SHORTNESS OF BREATH: 0
WHEEZING: 1
NAUSEA: 0
COUGH: 1
ABDOMINAL PAIN: 0
SHORTNESS OF BREATH: 1
FEVER: 0
ABDOMINAL PAIN: 0

## 2022-03-26 NOTE — ED PROVIDER NOTES
History   Chief Complaint:  Shortness of Breath       The history is provided by the patient and a relative.      Nelia Solano is a 89 year old female with history of hypertension, hyperlipidemia, anxiety, and DVT who presents with shortness of breath. Tonight when the patient's care service came to help her get ready for bed they noticed she was wheezing and her blood oxygen level was at 88%. She also notes a baseline cough is present, but explains this is no worse then normal. The nurses were concerned with pneumonia and suggested she come into the ED. Upon arrival to the hospital the patient explains she is feeling good. Her son notes that when he saw her this morning she was at her baseline. The patient denies any fever, change in appetite, or chest pain.    Review of Systems   Constitutional: Negative for appetite change and fever.   Respiratory: Positive for cough, shortness of breath and wheezing.    Cardiovascular: Negative for chest pain.   Gastrointestinal: Negative for abdominal pain, nausea and vomiting.   All other systems reviewed and are negative.        Allergies:  No Known Allergies    Medications:  Alprazolam   calcium carb   cimetidine   folic acid   methotrexate   nystatin  primidone   Vitamin D    Past Medical History:     Acid indigestion  Anxiety  Dysphagia  Hypertension  Hyperlipidemia  Inclusion body myositis  Spinal stenosis, lumbar  Tremor  Vertigo  Anemia  Deep vein thrombosis (DVT) of proximal vein of left lower extremity    Cystadenoma  Cataract    Past Surgical History:    Hysterectomy supracervical, bilateral salpingo-oophorectomy, combined  Joint replacement   Open reduction internal fixation tibia   Cataract extraction with intraocular lens implant     Family History:    No past pertinent family history.    Social History:  Presents with son.   PCP: Pavan Kern     Physical Exam     Patient Vitals for the past 24 hrs:   BP Temp Temp src Pulse Resp SpO2 Weight   03/26/22  0300 104/62 -- -- 88 -- 92 % --   03/26/22 0230 122/70 -- -- 95 -- 95 % --   03/26/22 0200 126/79 -- -- 88 -- 94 % --   03/26/22 0130 (!) 143/83 -- -- 98 -- 93 % --   03/26/22 0049 (!) 146/70 99.3  F (37.4  C) Oral 103 22 93 % 59 kg (130 lb)       Physical Exam  General: Appears well-developed and well-nourished.   Head: No signs of trauma.   CV: Normal rate and regular rhythm.    Resp: Effort normal and breath sounds normal. No respiratory distress.   GI: Soft. There is no tenderness.  No rebound or guarding.  Normal bowel sounds.    MSK: Normal range of motion. no edema. No Calf tenderness.  Neuro: The patient is alert and oriented. Speech normal.  Skin: Skin is warm and dry. No rash noted.   Psych: normal mood and affect. behavior is normal.     Emergency Department Course   ECG  ECG obtained at 0133, ECG read at 0137  Normal sinus rhythm. Normal ECG.   No significant change as compared to prior, dated 08/21/2018.  Rate 95 bpm. NY interval 148 ms. QRS duration 88 ms. QT/QTc 348/437 ms. P-R-T axes 51 -3 -3.     Imaging:  XR Chest Port 1 View   Final Result   IMPRESSION: The patient is rotated and tilted. A few strands of platelike atelectasis in both lungs, more apparent in the lower lungs. Airspace infiltrates bilaterally seen previously have largely resolved. No adenopathy or effusion. Normal cardiac size    and pulmonary vascularity. Atherosclerotic aorta. Degenerative changes both shoulders and the spine.        Report per radiology    Laboratory:  Labs Ordered and Resulted from Time of ED Arrival to Time of ED Departure   BASIC METABOLIC PANEL - Abnormal       Result Value    Sodium 141      Potassium 3.5      Chloride 106      Carbon Dioxide (CO2) 30      Anion Gap 5      Urea Nitrogen 12      Creatinine 0.52      Calcium 9.6      Glucose 133 (*)     GFR Estimate 86     CBC WITH PLATELETS AND DIFFERENTIAL - Abnormal    WBC Count 12.7 (*)     RBC Count 4.38      Hemoglobin 13.9      Hematocrit 44.3      MCV  101 (*)     MCH 31.7      MCHC 31.4 (*)     RDW 15.7 (*)     Platelet Count 92 (*)     % Neutrophils 77      % Lymphocytes 14      % Monocytes 6      % Eosinophils 3      % Basophils 0      % Immature Granulocytes 0      NRBCs per 100 WBC 0      Absolute Neutrophils 9.7 (*)     Absolute Lymphocytes 1.7      Absolute Monocytes 0.8      Absolute Eosinophils 0.3      Absolute Basophils 0.1      Absolute Immature Granulocytes 0.0      Absolute NRBCs 0.0     LACTIC ACID WHOLE BLOOD - Normal    Lactic Acid 1.8     INFLUENZA A/B & SARS-COV2 PCR MULTIPLEX - Normal    Influenza A PCR Negative      Influenza B PCR Negative      RSV PCR Negative      SARS CoV2 PCR Negative         Emergency Department Course:         Reviewed:  I reviewed nursing notes, vitals, past medical history and Care Everywhere    Assessments:  0112 I obtained history and examined the patient as noted above.   0305 I rechecked the patient and explained findings.     Disposition:  The patient was discharged to home.     Impression & Plan     Medical Decision Making:  Nelia Solano is a 89-year-old woman who presents due to concerns for a cough.  She apparently has a chronic intermittent cough primarily at night.  She has medical staff that help put her to bed each night and apparently tonight there was a nurse that helped her.  When she had some coughing the nurse checked her vital signs and reported that her O2 sats were somewhat low and sent her to the hospital.  On my evaluation the patient reports that she feels well.  She states that she is not feeling short of breath and has felt well throughout the day.  She states that this cough is not uncommon for her which is confirmed by the patient's son.  On my evaluation, her lung sounds were clear.  Her vital signs were appropriate with O2 sats in the mid 90s.  Blood work was obtained was overall reassuring.  It showed a slight elevation of her white blood cell count, but not significantly so.  Lactic acid  was normal.  Chest x-ray did not show any convincing evidence of new pneumonia or any other significant acute process and looked improved from prior.  Patient continued to feel quite well, was able to sleep comfortably in the ER.  In the setting I felt that she was appropriate for discharge back to home with recommendation for continued monitoring and return for any further concerns.      Diagnosis:    ICD-10-CM    1. Cough  R05.9        Scribe Disclosure:  I, Itzel Gallego, am serving as a scribe at 1:05 AM on 3/26/2022 to document services personally performed by Clay Peterson MD based on my observations and the provider's statements to me.            Clay Peterson MD  03/26/22 0516

## 2022-03-26 NOTE — DISCHARGE INSTRUCTIONS
Your chest xray looks reassuring.  Please return to the hospital if you have worsening cough, difficulty breathing, fevers, or any other concerns.

## 2022-04-07 NOTE — PROGRESS NOTES
SUBJECTIVE:    Nelia Solano, is a 89 year old female with history of hypertension, hyperlipidemia, anxiety, and PMHx of DVT  presenting with her son for the below:     Moving into assisted living facility ([facility] in [place]) with . Tentative planned date for moving is 4/11/2022.  was prior main care giver. Due to his ailing health they are now moving to assisted living as a couple.     Inclusion body myositis  : resultant reduced mobility / generalized weakness / deconditioning. Needs sofia lift for transfers, unable to stand for any period of time. Mobilizes in wheel chair. Needs food to be pureed due to effects on swallowing/ dysphagia and GERD.  Under the care of  rheumatologist and on primidone, alprazolam and methotrexate    Hypertension : PMHx of this. Previously taking lisinopril 2.5 mg daily. Stopped Feb 2021 due to soft blood pressures.   Blood pressure normotensive since.     Type 2 diabetes, diet-controlled       OBJECTIVE:  Vitals:    04/07/22 1030   BP: 112/62   Pulse: 82   SpO2: 98%    There is no height or weight on file to calculate BMI.    General: no acute distress, cooperative with exam, seated in wheelchair, slumped position  Eyes: no injection or drainage.  Neck: supple, no thyroid nodules or enlargement.  Lungs: clear to auscultation bilaterally, normal respiratory effort.  Heart: regular rate, normal S1 and S2, no murmurs.   Abdomen: normal bowel sounds, nontender, no palpable organomegaly.  Extremities: warm, perfused, no swelling or edema.   Psych: mental status normal, mood and affect appropriate, slightly hard of hearing.      ASSESSMENT / PLAN:      Encounter for completion of form with patient  Essential hypertension  Deep vein thrombosis (DVT) of proximal vein of left lower extremity, unspecified chronicity (H)  Inclusion body myositis  Physical deconditioning  Primary hypertension    Paper work for assisted living facility completed and will be faxed. See scan  doc for details. Tentative planned date for moving is 4/11/2022. No medication changes made.

## 2022-07-28 NOTE — ED TRIAGE NOTES
"Patient was brought in by her sons from [facility]. Pt has hx. Of dysphagia, tonight after dinner pt was coughing, having some \"gargling sounds\" and her SpO2 was 78%.     Triage Assessment     Row Name 07/27/22 [id number]       Triage Assessment (Adult)    Airway WDL X    Additional Documentation Breath Sounds (Group)       Breath Sounds    Breath Sounds All [place]  reported \"gargling sounds\"       Skin Circulation/Temperature WDL    Skin Circulation/Temperature WDL WDL       Cardiac WDL    Cardiac WDL WDL       Peripheral/Neurovascular WDL    Peripheral Neurovascular WDL WDL       Cognitive/Neuro/Behavioral WDL    Cognitive/Neuro/Behavioral WDL WDL              "

## 2022-07-28 NOTE — DISCHARGE INSTRUCTIONS
It is imperative Ms. Link be set up for a swallow evaluation as soon as possible.  Please schedule this for her.  Return to the ER for persistent hypoxia or ANY other emergent concerns.

## 2022-07-28 NOTE — ED PROVIDER NOTES
History   Chief Complaint:  Hypoxia       The history is provided by a relative.      Nelia Solano is a 89 year old female with history of myositis, spinal stenosis and DVT who presents with hypoxia. Patient's son reports she was seen back in march for hypoxia, but is brought in this time for gurgling in her chest. Notes recently increased coughing which is more prominent after consuming a meal. She also spits up over half of her meals. Patient does not walk, and is weak at baseline (leading her to eat slumped over). Denies abdominal pain, shortness of breath or any other pain.     Review of Systems   Respiratory: Positive for cough. Negative for shortness of breath.    Gastrointestinal: Negative for abdominal pain.   All other systems reviewed and are negative.    Allergies:  No Known Allergies    Medications:  ALPRAZolam (XANAX) 0.25 MG tablet  cimetidine (TAGAMET) 400 MG tablet  folic acid (FOLVITE) 1 MG tablet  methotrexate 2.5 MG tablet  primidone (MYSOLINE) 50 MG tablet    Past Medical History:     Myositis   Spinal stenosis   Vertigo  Essential tremor   Anemia   DVT    Past Surgical History:    Hysterectomy   Joint replacement   Ortho surgery  Cataract extraction     Social History:  The patient presents to the ED with her sons.     Physical Exam     Patient Vitals for the past 24 hrs:   BP Temp Temp src Pulse Resp SpO2 Weight   07/27/22 2310 -- -- -- -- -- 98 % --   07/27/22 2240 115/54 -- -- -- -- 93 % --   07/27/22 2222 112/50 96.9  F (36.1  C) Temporal 99 20 94 % 44.5 kg (98 lb)       Physical Exam  Eye:  Pupils are equal, round, and reactive.  Extraocular movements intact.    ENT:  No rhinorrhea.  Moist mucus membranes.  Normal tongue and tonsil.    Cardiac:  Regular rate and rhythm.  No murmurs, gallops, or rubs.    Pulmonary:  Clear to auscultation bilaterally.  No wheezes, rales, or rhonchi.    Abdomen:  Positive bowel sounds.  Abdomen is soft and non-distended, without focal  tenderness.    Musculoskeletal:  Normal movement of all extremities without evidence for deficit. Significant atrophy secondary to known myositis     Skin:  Warm and dry without rashes.    Neurologic:  Limited movement of extremities due to known myositis.    Psychiatric:  Normal affect with appropriate interaction with examiner.    Emergency Department Course     Imaging:  Chest XR,  PA & LAT   Final Result   IMPRESSION: There appears to be less infiltrate/atelectasis seen in the right lung base since 03/26/2022, otherwise the chest appears stable with again seen minimal infiltrate/atelectasis in the left lung base. There are no consolidative infiltrates    identified. Minimal elevation left hemidiaphragm. Slightly calcified thoracic aorta.        Report per radiology    Laboratory:  Labs Ordered and Resulted from Time of ED Arrival to Time of ED Departure - No data to display       Emergency Department Course:         Reviewed:  I reviewed nursing notes, vitals, past medical history and Care Everywhere    Assessments/Consults:  ED Course as of 07/28/22 0023   Wed Jul 27, 2022   2352 I obtained history and examined the patient.      Disposition:  The patient was discharged to home.     Impression & Plan     CMS Diagnoses: None      Medical Decision Making:  This unfortunate 89-year-old woman with a progressive myositis causing generalized weakness and now some swallowing difficulties presents to us with possible choking or hypoxic spell.  She had a very similar presentation in March when she was found to be hypoxic by the staff at her facility and she was sent in for evaluation.  She underwent an x-ray and a full panel of labs which were all unremarkable and her oxygenation was normal.  Family has noted that she is struggling with eating.  They note that she has a difficult time getting thicker foods to go down and she ends up spitting some of these up.  They also note that she coughs at times when she drinks  "liquids.  Tonight, as she was being put to sleep, they noted some \"gurgling in her chest.\"  They checked an oxygen level and found her to be hypoxic in the upper 70s.  Per the son's report, she was also mildly tachypneic and brought up her heart rate.  However, they did not feel that she truly was in any distress and brought her in for assessment.  However, by the time that she arrived in the emergency department, she has no abnormality in her vital signs.  Her oxygenation is in the mid 90s.  She is not tachypneic.  She is not tachycardic.  On exam, her lungs are completely clear.  She underwent a chest x-ray which is normal as well.    With this, I spoke with the sons at length of how to proceed.  I explained to them that I feel all of this is likely due to her progressive myositis and issues with dysphagia.  I explained that she would most benefit from a formal swallow study to determine if thickening her liquids would be helpful.  I explained that she is at risk for aspiration.  However, there is no indication for antibiotics at this time.  There is no outright indication for admission as this swallow evaluation can be performed as an outpatient.  Considering her advanced age, family is interested in pursuing outpatient evaluation for this,.  The patient is also alert and voices her lack of desire to be admitted to the hospital, preferring to sleep in her own bed.  I fully support this.  I spoke with her at length about being very careful with liquids, to work with small sips.  She was also advised to take very small bites of food.  Family understands that they can return at anytime for admission to the hospital if they change their mind or if she has any further choking or hypoxic spells.    Diagnosis:    ICD-10-CM    1. Hypoxic episode  R09.02    2. Dysphagia, unspecified type  R13.10        Scribe Disclosure:  I, TEO ANGULO, am serving as a scribe at 10:45 PM on 7/27/2022 to document services personally " performed by Trierweiler, Chad A, MD based on my observations and the provider's statements to me.        Trierweiler, Chad A, MD  07/28/22 [id number]

## 2022-07-29 NOTE — TELEPHONE ENCOUNTER
Telephone call  Son calling he took his mother to the ED 7/27/2022 and was told that she would benefit form a swallowing study.  She was wondering how to get that set up.  No consent to communicate on file.  Did explain he would need to call her physician and ask for a referral. [facility] physician      Andreina Gambino RN   [facility] Nurse Advisor  10:35 AM 7/29/2022